# Patient Record
Sex: MALE | Race: BLACK OR AFRICAN AMERICAN | Employment: UNEMPLOYED | URBAN - METROPOLITAN AREA
[De-identification: names, ages, dates, MRNs, and addresses within clinical notes are randomized per-mention and may not be internally consistent; named-entity substitution may affect disease eponyms.]

---

## 2018-02-26 ENCOUNTER — HOSPITAL ENCOUNTER (EMERGENCY)
Facility: CLINIC | Age: 23
Discharge: HOME | End: 2018-02-26
Attending: PHYSICIAN ASSISTANT | Admitting: PHYSICIAN ASSISTANT

## 2018-02-26 VITALS
RESPIRATION RATE: 18 BRPM | OXYGEN SATURATION: 95 % | HEART RATE: 74 BPM | DIASTOLIC BLOOD PRESSURE: 79 MMHG | SYSTOLIC BLOOD PRESSURE: 117 MMHG | TEMPERATURE: 98 F

## 2018-02-26 DIAGNOSIS — Z87.898 H/O IDIOPATHIC SEIZURE: ICD-10-CM

## 2018-02-26 DIAGNOSIS — Z76.0 ENCOUNTER FOR MEDICATION REFILL: ICD-10-CM

## 2018-02-26 PROCEDURE — 99282 EMERGENCY DEPT VISIT SF MDM: CPT

## 2018-02-26 RX ORDER — CARBAMAZEPINE 200 MG/1
400 TABLET, EXTENDED RELEASE ORAL 2 TIMES DAILY
Qty: 120 TABLET | Refills: 0 | Status: SHIPPED | OUTPATIENT
Start: 2018-02-26 | End: 2018-03-29

## 2018-02-26 ASSESSMENT — ENCOUNTER SYMPTOMS
SEIZURES: 0
FEVER: 0
COUGH: 0
CHILLS: 0

## 2018-02-26 NOTE — ED PROVIDER NOTES
History     Chief Complaint:  Medication Refill    HPI   René Kumar is a 22 year old male who presents to the ED for a medication refill. The patient is on Tegretol-XR, 200 mg where he takes 2, 200 mg tablets twice a day. The patient last ran out of this medication last night, where he missed a dose this morning but was able to take his dose last night. He notes that he just moving to [place] and doesn't have insurance so came into the ED for a refill of this medication. The patient denies any symptoms and was told to come in here for refill of medications.    Allergies:  No known drug allergies     Medications:    Tegretol- mg 2 tabl by mouth 2x day    Past Medical History:    Seizures    Past Surgical History:    History reviewed. No pertinent surgical history.    Family History:    History reviewed. No pertinent family history.     Social History:  Smoking status: Never  Alcohol use: No  Marital Status:       Review of Systems   Constitutional: Negative for chills and fever.   Respiratory: Negative for cough.    Neurological: Negative for seizures.   All other systems reviewed and are negative.    Physical Exam     Patient Vitals for the past 24 hrs:   BP Temp Pulse Heart Rate Resp SpO2   02/26/18 1635 117/79 98  F (36.7  C) 74 74 18 95 %       Physical Exam  Constitutional: Alert, attentive  HENT:    Nose: Nose normal.    Mouth/Throat: Oropharynx is clear, mucous membranes are moist   Eyes: EOM are normal. Pupils are equal, round, and reactive to light.   CV: regular rate and rhythm  Chest: Effort normal and breath sounds normal.   MSK: Normal range of motion.   Neurological: Alert, attentive  Skin: Skin is warm and dry.     Emergency Department Course   Emergency Department Course:  Past medical records, nursing notes, and vitals reviewed.  4:40pm: I performed an exam of the patient and obtained history, as documented above.     I rechecked the patient. Findings and plan explained to the  Patient and family member. Patient discharged home with instructions regarding supportive care, medications, and reasons to return. The importance of close follow-up was reviewed.     Impression & Plan    Medical Decision Making:  René Kumar is a 22 year old male presented with request for medication that is without abuse potential. Requested refill until able to see PCP. They just moved to the [facility] from [place] and this medication is important for his health therefore I did refill prescription for 30 days to give them time for their insurance to begin. They have applied for [place] insurance and are waiting for that to start.  These medications do appear appropriate to maintain his health and as so they are refilled.  He had no other concerns or complaints.  He will follow-up with his primary care provider for further refills.    Diagnosis:    ICD-10-CM   1. Encounter for medication refill Z76.0   2. H/O idiopathic seizure Z87.898       Disposition:  discharged to home    Discharge Medications:  New Prescriptions    CARBAMAZEPINE (TEGRETOL XR) 200 MG 12 HR TABLET    Take 2 tablets (400 mg) by mouth 2 times daily       Rosa Ramon  2/26/2018   [facility] EMERGENCY DEPARTMENT  I, Rosa Ramon, am serving as a scribe at 4:40 PM on 2/26/2018 to document services personally performed by Piedad Peña PA-C based on my observations and the provider's statements to me.      Piedad Peña PA-C  02/26/18 1827

## 2018-02-26 NOTE — ED AVS SNAPSHOT
[facility] Emergency Department    [address]    [facility] [address]    Phone:  [phone]    Fax:  [phone]                                       René Kumar   MRN: [id number]    Department:  [facility] Emergency Department   Date of Visit:  2/26/2018           Patient Information     Date Of Birth          1995        Your diagnoses for this visit were:     Encounter for medication refill     H/O idiopathic seizure        You were seen by Piedad Peña PA-C.      Follow-up Information     Follow up with [facility] In 2 days.    Specialties:  Sports Medicine, Pain Management, Obstetrics & Gynecology, Pediatrics, Internal Medicine, Nephrology    Why:  As needed    Contact information:    [address]  [facility] [address] [phone]        Follow up with [facility] Emergency Department.    Specialty:  EMERGENCY MEDICINE    Why:  If symptoms worsen    Contact information:    [address]  [facility] [address] [phone]        Discharge Instructions       Please make sure you follow up with a clinic for further refills on the Tegretol.     24 Hour Appointment Hotline       To make an appointment at any [facility], call [phone] ([phone]). If you don't have a family doctor or clinic, we will help you find one. [place] clinics are conveniently located to serve the needs of you and your family.             Review of your medicines      CONTINUE these medicines which may have CHANGED, or have new prescriptions. If we are uncertain of the size of tablets/capsules you have at home, strength may be listed as something that might have changed.        Dose / Directions Last dose taken    * TEGRETOL PO   Dose:  200 mg   What changed:  Another medication with the same name was added. Make sure you understand how and when to take each.        Take  200 mg by mouth   Refills:  0        * carBAMazepine 200 MG 12 hr tablet   Commonly known as:  TEGRETOL XR   Dose:  400 mg   What changed:  You were already taking a medication with the same name, and this prescription was added. Make sure you understand how and when to take each.   Quantity:  120 tablet        Take 2 tablets (400 mg) by mouth 2 times daily   Refills:  0        * Notice:  This list has 2 medication(s) that are the same as other medications prescribed for you. Read the directions carefully, and ask your doctor or other care provider to review them with you.            Prescriptions were sent or printed at these locations (1 Prescription)                   Other Prescriptions                Printed at Department/Unit printer (1 of 1)         carBAMazepine (TEGRETOL XR) 200 MG 12 hr tablet                Orders Needing Specimen Collection     None      Pending Results     No orders found from 2/24/2018 to 2/27/2018.            Pending Culture Results     No orders found from 2/24/2018 to 2/27/2018.            Pending Results Instructions     If you had any lab results that were not finalized at the time of your Discharge, you can call the ED Lab Result RN at [phone]. You will be contacted by this team for any positive Lab results or changes in treatment. The nurses are available 7 days a week from 10A to 6:30P.  You can leave a message 24 hours per day and they will return your call.        Test Results From Your Hospital Stay               Clinical Quality Measure: Blood Pressure Screening     Your blood pressure was checked while you were in the emergency department today. The last reading we obtained was  BP: 117/79 . Please read the guidelines below about what these numbers mean and what you should do about them.  If your systolic blood pressure (the top number) is less than 120 and your diastolic blood pressure (the bottom number) is less than 80, then your blood pressure is normal. There is  "nothing more that you need to do about it.  If your systolic blood pressure (the top number) is 120-139 or your diastolic blood pressure (the bottom number) is 80-89, your blood pressure may be higher than it should be. You should have your blood pressure rechecked within a year by a primary care provider.  If your systolic blood pressure (the top number) is 140 or greater or your diastolic blood pressure (the bottom number) is 90 or greater, you may have high blood pressure. High blood pressure is treatable, but if left untreated over time it can put you at risk for heart attack, stroke, or kidney failure. You should have your blood pressure rechecked by a primary care provider within the next 4 weeks.  If your provider in the emergency department today gave you specific instructions to follow-up with your doctor or provider even sooner than that, you should follow that instruction and not wait for up to 4 weeks for your follow-up visit.        Thank you for choosing [facility]       Thank you for choosing [facility] for your care. Our goal is always to provide you with excellent care. Hearing back from our patients is one way we can continue to improve our services. Please take a few minutes to complete the written survey that you may receive in the mail after you visit with us. Thank you!        [employer] Information     [employer] lets you send messages to your doctor, view your test results, renew your prescriptions, schedule appointments and more. To sign up, go to [email or web address] . Click on \"Log in\" on the left side of the screen, which will take you to the Welcome page. Then click on \"Sign up Now\" on the right side of the page.     You will be asked to enter the access code listed below, as well as some personal information. Please follow the directions to create your username and password.     Your access code is: [email or web address]  Expires: 2018  5:15 PM     Your access code will  in 90 days. If you " need help or a new code, please call your [facility] [place] clinic or [phone].        Care EveryWhere ID     This is your Care EveryWhere ID. This could be used by other organizations to access your [facility] medical records  [id number]        Equal Access to Services     LINDA LUCERO : Facundo Barba, clementine ruiz, elba kaalpercy chilel, saleem chan. [facility] [phone].    ATENCIÓN: Si habla español, tiene a bryant disposición servicios gratuitos de asistencia lingüística. Llame al [phone].    We comply with applicable federal civil rights laws and [place] laws. We do not discriminate on the basis of race, color, national origin, age, disability, sex, sexual orientation, or gender identity.            After Visit Summary       This is your record. Keep this with you and show to your community pharmacist(s) and doctor(s) at your next visit.

## 2018-02-26 NOTE — ED NOTES
Patient presents with complaints of needing refill of seizure. Patient ran out last night. Patient takes medication at night and he received his does last night.

## 2018-03-29 ENCOUNTER — OFFICE VISIT (OUTPATIENT)
Dept: INTERNAL MEDICINE | Facility: CLINIC | Age: 23
End: 2018-03-29
Payer: MEDICAID

## 2018-03-29 VITALS
SYSTOLIC BLOOD PRESSURE: 108 MMHG | HEIGHT: 64 IN | HEART RATE: 70 BPM | TEMPERATURE: 98.9 F | DIASTOLIC BLOOD PRESSURE: 82 MMHG | OXYGEN SATURATION: 96 % | RESPIRATION RATE: 16 BRPM | WEIGHT: 162.6 LBS | BODY MASS INDEX: 27.76 KG/M2

## 2018-03-29 DIAGNOSIS — G40.909 SEIZURE DISORDER (H): Primary | ICD-10-CM

## 2018-03-29 LAB — CARBAMAZEPINE SERPL-MCNC: 14.5 MG/L (ref 4–12)

## 2018-03-29 PROCEDURE — 99202 OFFICE O/P NEW SF 15 MIN: CPT | Performed by: NURSE PRACTITIONER

## 2018-03-29 PROCEDURE — 80156 ASSAY CARBAMAZEPINE TOTAL: CPT | Performed by: NURSE PRACTITIONER

## 2018-03-29 PROCEDURE — 36415 COLL VENOUS BLD VENIPUNCTURE: CPT | Performed by: NURSE PRACTITIONER

## 2018-03-29 RX ORDER — CARBAMAZEPINE 200 MG/1
400 TABLET, EXTENDED RELEASE ORAL 2 TIMES DAILY
Qty: 120 TABLET | Refills: 3 | Status: SHIPPED | OUTPATIENT
Start: 2018-03-29 | End: 2018-06-27

## 2018-03-29 NOTE — PROGRESS NOTES
"  SUBJECTIVE:   René Kumar is a 22 year old male who presents to clinic today for the following health issues:      New Patient/Transfer of Care    H/o seizure disorder, needs med refill  Mother reports pt has difficulty sleeping, significant mood swings, believes it is due to meds.  Requests neurology referral.      Patient Active Problem List   Diagnosis     Seizure (H)     Past Surgical History:   Procedure Laterality Date     NO HISTORY OF SURGERY         Social History   Substance Use Topics     Smoking status: Never Smoker     Smokeless tobacco: Never Used     Alcohol use No     History reviewed. No pertinent family history.      Current Outpatient Prescriptions   Medication Sig Dispense Refill     carBAMazepine (TEGRETOL XR) 200 MG 12 hr tablet Take 2 tablets (400 mg) by mouth 2 times daily 120 tablet 3     CarBAMazepine (TEGRETOL PO) Take 200 mg by mouth       [DISCONTINUED] carBAMazepine (TEGRETOL XR) 200 MG 12 hr tablet Take 2 tablets (400 mg) by mouth 2 times daily 120 tablet 0     BP Readings from Last 3 Encounters:   03/29/18 108/82   02/26/18 117/79    Wt Readings from Last 3 Encounters:   03/29/18 162 lb 9.6 oz (73.8 kg)                    Reviewed and updated as needed this visit by clinical staff  Tobacco  Allergies  Meds  Med Hx  Surg Hx  Fam Hx  Soc Hx      Reviewed and updated as needed this visit by Provider         ROS:  Constitutional, HEENT, cardiovascular, pulmonary, gi and gu systems are negative, except as otherwise noted.    OBJECTIVE:     /82 (BP Location: Right arm, Patient Position: Sitting, Cuff Size: Adult Large)  Pulse 70  Temp 98.9  F (37.2  C) (Oral)  Resp 16  Ht 5' 4\" (1.626 m)  Wt 162 lb 9.6 oz (73.8 kg)  SpO2 96%  BMI 27.91 kg/m2  Body mass index is 27.91 kg/(m^2).  GENERAL: healthy, alert and no distress  PSYCH: pt is nonverbal during visit, all information given by mother and brother through interrupter         ASSESSMENT/PLAN:               " ICD-10-CM    1. Seizure disorder (H) G40.909 carBAMazepine (TEGRETOL XR) 200 MG 12 hr tablet     Carbamazepine total     NEUROLOGY ADULT REFERRAL       F/u prn    Teagan Mckeon NP  [facility]

## 2018-03-29 NOTE — NURSING NOTE
"Chief Complaint   Patient presents with     Establish Care     Refill Request       Initial /82 (BP Location: Right arm, Patient Position: Sitting, Cuff Size: Adult Large)  Pulse 70  Temp 98.9  F (37.2  C) (Oral)  Resp 16  Ht 5' 4\" (1.626 m)  Wt 162 lb 9.6 oz (73.8 kg)  SpO2 96%  BMI 27.91 kg/m2 Estimated body mass index is 27.91 kg/(m^2) as calculated from the following:    Height as of this encounter: 5' 4\" (1.626 m).    Weight as of this encounter: 162 lb 9.6 oz (73.8 kg).  Medication Reconciliation: complete    "

## 2018-03-29 NOTE — MR AVS SNAPSHOT
After Visit Summary   3/29/2018    René Kumar    MRN: [id number]           Patient Information     Date Of Birth          1995        Visit Information        Provider Department      3/29/2018 8:00 AM Teagan Mckeon NP; [employer] [facility]        Today's Diagnoses     Seizure disorder (H)    -  1       Follow-ups after your visit        Additional Services     NEUROLOGY ADULT REFERRAL       Your provider has referred you for the following:   Consult at [facility]: [facility] Neurology [facility] [phone]   [email or web address]    Please be aware that coverage of these services is subject to the terms and limitations of your health insurance plan.  Call member services at your health plan with any benefit or coverage questions.      Please bring the following with you to your appointment:    (1) Any X-Rays, CTs or MRIs which have been performed.  Contact the facility where they were done to arrange for  prior to your scheduled appointment.    (2) List of current medications  (3) This referral request   (4) Any documents/labs given to you for this referral                  Who to contact     If you have questions or need follow up information about today's clinic visit or your schedule please contact [facility] directly at [phone].  Normal or non-critical lab and imaging results will be communicated to you by MyChart, letter or phone within 4 business days after the clinic has received the results. If you do not hear from us within 7 days, please contact the clinic through MyChart or phone. If you have a critical or abnormal lab result, we will notify you by phone as soon as possible.  Submit refill requests through [employer] or call your pharmacy and they will forward the refill request to us. Please allow 3 business days for your refill to be completed.          Additional  "Information About Your Visit        MyChart Information     [employer] lets you send messages to your doctor, view your test results, renew your prescriptions, schedule appointments and more. To sign up, go to [email or web address] . Click on \"Log in\" on the left side of the screen, which will take you to the Welcome page. Then click on \"Sign up Now\" on the right side of the page.     You will be asked to enter the access code listed below, as well as some personal information. Please follow the directions to create your username and password.     Your access code is: [email or web address]  Expires: 2018  6:15 PM     Your access code will  in 90 days. If you need help or a new code, please call your [facility] clinic or [phone].        Care EveryWhere ID     This is your Care EveryWhere ID. This could be used by other organizations to access your [facility] medical records  [id number]        Your Vitals Were     Pulse Temperature Respirations Height Pulse Oximetry BMI (Body Mass Index)    70 98.9  F (37.2  C) (Oral) 16 5' 4\" (1.626 m) 96% 27.91 kg/m2       Blood Pressure from Last 3 Encounters:   18 108/82   18 117/79    Weight from Last 3 Encounters:   18 162 lb 9.6 oz (73.8 kg)              We Performed the Following     Carbamazepine total     NEUROLOGY ADULT REFERRAL          Where to get your medicines      These medications were sent to [facility] [address] [place] [address] [id number] [place] [address]  [address] [facility] [address]    Hours:  24-hours Phone:  [phone]     carBAMazepine 200 MG 12 hr tablet          Primary Care Provider Fax #    Physician No Ref-Primary [phone]       No address on file        Equal Access to Services     LINDA LUCERO : Facundo Barba, clementine ruiz, qaybta kaalpercy chilel, saleem chan. [facility] [phone].    ATENCIÓN: Si lauren hunter " disposición servicios gratuitos de asistencia lingüística. Johan proctor [phone].    We comply with applicable federal civil rights laws and [place] laws. We do not discriminate on the basis of race, color, national origin, age, disability, sex, sexual orientation, or gender identity.            Thank you!     Thank you for choosing [facility]  for your care. Our goal is always to provide you with excellent care. Hearing back from our patients is one way we can continue to improve our services. Please take a few minutes to complete the written survey that you may receive in the mail after your visit with us. Thank you!             Your Updated Medication List - Protect others around you: Learn how to safely use, store and throw away your medicines at www.disposemymeds.org.          This list is accurate as of 3/29/18  8:33 AM.  Always use your most recent med list.                   Brand Name Dispense Instructions for use Diagnosis    * TEGRETOL PO      Take 200 mg by mouth        * carBAMazepine 200 MG 12 hr tablet    TEGRETOL XR    120 tablet    Take 2 tablets (400 mg) by mouth 2 times daily    Seizure disorder (H)       * Notice:  This list has 2 medication(s) that are the same as other medications prescribed for you. Read the directions carefully, and ask your doctor or other care provider to review them with you.

## 2018-05-10 ENCOUNTER — TRANSFERRED RECORDS (OUTPATIENT)
Dept: HEALTH INFORMATION MANAGEMENT | Facility: CLINIC | Age: 23
End: 2018-05-10

## 2018-05-23 ENCOUNTER — TRANSFERRED RECORDS (OUTPATIENT)
Dept: HEALTH INFORMATION MANAGEMENT | Facility: CLINIC | Age: 23
End: 2018-05-23

## 2018-06-27 ENCOUNTER — OFFICE VISIT (OUTPATIENT)
Dept: INTERNAL MEDICINE | Facility: CLINIC | Age: 23
End: 2018-06-27
Payer: COMMERCIAL

## 2018-06-27 VITALS
HEIGHT: 64 IN | RESPIRATION RATE: 16 BRPM | WEIGHT: 165.2 LBS | OXYGEN SATURATION: 95 % | HEART RATE: 80 BPM | TEMPERATURE: 98 F | DIASTOLIC BLOOD PRESSURE: 78 MMHG | SYSTOLIC BLOOD PRESSURE: 100 MMHG | BODY MASS INDEX: 28.2 KG/M2

## 2018-06-27 DIAGNOSIS — G40.909 SEIZURE DISORDER (H): ICD-10-CM

## 2018-06-27 DIAGNOSIS — F09 COGNITIVE DISORDER: ICD-10-CM

## 2018-06-27 PROCEDURE — 99213 OFFICE O/P EST LOW 20 MIN: CPT | Performed by: NURSE PRACTITIONER

## 2018-06-27 RX ORDER — CARBAMAZEPINE 200 MG/1
400 TABLET, EXTENDED RELEASE ORAL 2 TIMES DAILY
Qty: 120 TABLET | Refills: 3 | Status: SHIPPED | OUTPATIENT
Start: 2018-06-27 | End: 2018-09-12

## 2018-06-27 NOTE — PROGRESS NOTES
"  SUBJECTIVE:   René Kumar is a 22 year old male who presents to clinic today for the following health issues:      [place]eds forms [place] for the county, pt can not work due to disability  Had neurology consult, did not continue remeron as it made pt more irritable   Has not had a psychiatric evaluation yet.        Patient Active Problem List   Diagnosis     Seizure (H)     Cognitive disorder     Past Surgical History:   Procedure Laterality Date     NO HISTORY OF SURGERY         Social History   Substance Use Topics     Smoking status: Never Smoker     Smokeless tobacco: Never Used     Alcohol use No     History reviewed. No pertinent family history.      Current Outpatient Prescriptions   Medication Sig Dispense Refill     carBAMazepine (TEGRETOL XR) 200 MG 12 hr tablet Take 2 tablets (400 mg) by mouth 2 times daily 120 tablet 3     CarBAMazepine (TEGRETOL PO) Take 200 mg by mouth       [DISCONTINUED] carBAMazepine (TEGRETOL XR) 200 MG 12 hr tablet Take 2 tablets (400 mg) by mouth 2 times daily 120 tablet 3     BP Readings from Last 3 Encounters:   06/27/18 100/78   03/29/18 108/82   02/26/18 117/79    Wt Readings from Last 3 Encounters:   06/27/18 165 lb 3.2 oz (74.9 kg)   03/29/18 162 lb 9.6 oz (73.8 kg)                    Reviewed and updated as needed this visit by clinical staff  Tobacco  Allergies  Meds  Med Hx  Surg Hx  Fam Hx  Soc Hx      Reviewed and updated as needed this visit by Provider         ROS:  Constitutional, HEENT, cardiovascular, pulmonary, gi and gu systems are negative, except as otherwise noted.    OBJECTIVE:     /78 (BP Location: Right arm, Patient Position: Sitting, Cuff Size: Adult Large)  Pulse 80  Temp 98  F (36.7  C) (Oral)  Resp 16  Ht 5' 4\" (1.626 m)  Wt 165 lb 3.2 oz (74.9 kg)  SpO2 95%  BMI 28.36 kg/m2  Body mass index is 28.36 kg/(m^2).  GENERAL: alert and no distress  PSYCH: does not engage in visit, no eye contact        ASSESSMENT/PLAN:               " ICD-10-CM    1. Cognitive disorder F09 MENTAL HEALTH REFERRAL  - Adult; Psychiatry and Medication Management; Psychiatry; [facility] Psychiatry Service [phone].  Medication management & future refills will be returned to G PCP upon completion of evaluation; We dandre...   2. Seizure disorder (H) G40.909 carBAMazepine (TEGRETOL XR) 200 MG 12 hr tablet       Forms completed  F/u psych and neuro    A total of 15 minutes was spent with the patient today, with greater than 50% of the visit involving counseling and coordination of care.      Teagan Mckeon NP  [facility]

## 2018-06-27 NOTE — MR AVS SNAPSHOT
After Visit Summary   6/27/2018    René Kumar    MRN: [id number]           Patient Information     Date Of Birth          1995        Visit Information        Provider Department      6/27/2018 2:45 PM Teagan Mckeon NP; [employer] [facility]        Today's Diagnoses     Cognitive disorder           Follow-ups after your visit        Additional Services     MENTAL HEALTH REFERRAL  - Adult; Psychiatry and Medication Management; Psychiatry; [facility] [facility] [phone].  Medication management & future refills will be returned to G PCP upon completion of evaluation; We dandre...       All scheduling is subject to the client's specific insurance plan & benefits, provider/location availability, and provider clinical specialities.  Please arrive 15 minutes early for your first appointment and bring your completed paperwork.    Please be aware that coverage of these services is subject to the terms and limitations of your health insurance plan.  Call member services at your health plan with any benefit or coverage questions.                            Who to contact     If you have questions or need follow up information about today's clinic visit or your schedule please contact [facility] directly at [phone].  Normal or non-critical lab and imaging results will be communicated to you by MyChart, letter or phone within 4 business days after the clinic has received the results. If you do not hear from us within 7 days, please contact the clinic through MyChart or phone. If you have a critical or abnormal lab result, we will notify you by phone as soon as possible.  Submit refill requests through [employer] or call your pharmacy and they will forward the refill request to us. Please allow 3 business days for your refill to be completed.          Additional Information About Your Visit        MyChart  "Information     Well[employer] lets you send messages to your doctor, view your test results, renew your prescriptions, schedule appointments and more. To sign up, go to [email or web address] . Click on \"Log in\" on the left side of the screen, which will take you to the Welcome page. Then click on \"Sign up Now\" on the right side of the page.     You will be asked to enter the access code listed below, as well as some personal information. Please follow the directions to create your username and password.     Your access code is: [email or web address]  Expires: 2018  3:00 PM     Your access code will  in 90 days. If you need help or a new code, please call your [facility] clinic or [phone].        Care EveryWhere ID     This is your Care EveryWhere ID. This could be used by other organizations to access your [facility] medical records  [id number]        Your Vitals Were     Pulse Temperature Respirations Height Pulse Oximetry BMI (Body Mass Index)    80 98  F (36.7  C) (Oral) 16 5' 4\" (1.626 m) 95% 28.36 kg/m2       Blood Pressure from Last 3 Encounters:   18 100/78   18 108/82   18 117/79    Weight from Last 3 Encounters:   18 165 lb 3.2 oz (74.9 kg)   18 162 lb 9.6 oz (73.8 kg)              We Performed the Following     MENTAL HEALTH REFERRAL  - Adult; Psychiatry and Medication Management; Psychiatry; [facility]: [facility] [phone].  Medication management & future refills will be returned to G PCP upon completion of evaluation; We dandre...        Primary Care Provider Fax #    Physician No Ref-Primary [phone]       No address on file        Equal Access to Services     LINDA LUCERO : Facundo Barba, clementine ruiz, elba chilel, saleem chan. So [facility] [phone].    ATENCIÓN: Si habla español, tiene a bryant disposición servicios gratuitos de asistencia lingüística. Llame al [phone].    We " comply with applicable federal civil rights laws and [place] laws. We do not discriminate on the basis of race, color, national origin, age, disability, sex, sexual orientation, or gender identity.            Thank you!     Thank you for choosing [facility]  for your care. Our goal is always to provide you with excellent care. Hearing back from our patients is one way we can continue to improve our services. Please take a few minutes to complete the written survey that you may receive in the mail after your visit with us. Thank you!             Your Updated Medication List - Protect others around you: Learn how to safely use, store and throw away your medicines at www.disposemymeds.org.          This list is accurate as of 6/27/18  3:31 PM.  Always use your most recent med list.                   Brand Name Dispense Instructions for use Diagnosis    * TEGRETOL PO      Take 200 mg by mouth        * carBAMazepine 200 MG 12 hr tablet    TEGRETOL XR    120 tablet    Take 2 tablets (400 mg) by mouth 2 times daily    Seizure disorder (H)       * Notice:  This list has 2 medication(s) that are the same as other medications prescribed for you. Read the directions carefully, and ask your doctor or other care provider to review them with you.

## 2018-08-01 ENCOUNTER — OFFICE VISIT (OUTPATIENT)
Dept: PSYCHIATRY | Facility: CLINIC | Age: 23
End: 2018-08-01
Attending: NURSE PRACTITIONER
Payer: COMMERCIAL

## 2018-08-01 DIAGNOSIS — G47.00 INSOMNIA, UNSPECIFIED TYPE: Primary | ICD-10-CM

## 2018-08-01 DIAGNOSIS — G40.909 SEIZURE DISORDER (H): ICD-10-CM

## 2018-08-01 DIAGNOSIS — R45.1 AGITATION: ICD-10-CM

## 2018-08-01 PROCEDURE — 90792 PSYCH DIAG EVAL W/MED SRVCS: CPT | Performed by: PSYCHIATRY & NEUROLOGY

## 2018-08-01 RX ORDER — QUETIAPINE FUMARATE 25 MG/1
TABLET, FILM COATED ORAL
Qty: 60 TABLET | Refills: 1 | Status: SHIPPED | OUTPATIENT
Start: 2018-08-01 | End: 2018-08-01

## 2018-08-01 RX ORDER — QUETIAPINE FUMARATE 25 MG/1
TABLET, FILM COATED ORAL
Qty: 60 TABLET | Refills: 1 | Status: SHIPPED | OUTPATIENT
Start: 2018-08-01 | End: 2018-09-05 | Stop reason: DRUGHIGH

## 2018-08-01 NOTE — MR AVS SNAPSHOT
After Visit Summary   8/1/2018    René Kumar    MRN: [id number]           Patient Information     Date Of Birth          1995        Visit Information        Provider Department      8/1/2018 3:45 PM Tomas Chambers MD; LANGUAGE [facility]        Today's Diagnoses     Insomnia, unspecified type    -  1    Agitation        Seizure disorder (H)          Care Instructions    - Will have our clinic  contact you and your family to offer resources for socialization and education  - Start Seroquel 25mg at bedtime for sleep. Can take an additional 25mg during daytime as needed for anger/agitation.   - Will make referral to neurology.   - Return to clinic in 1-2 months          Follow-ups after your visit        Additional Services     [facility] Epilepsy Referral       Your provider has referred you to: [facility]: [facility] [place] [phone]   [email or web address]  - Patient requires EEG and MRI to assess seizure disorder    Please be aware that coverage of these services is subject to the terms and limitations of your health insurance plan.  Call member services at your health plan with any benefit or coverage questions.      Please bring the following to your appointment:    >>   Any x-rays, CTs or MRIs which have been performed.  Contact the facility where they were done to arrange for  prior to your scheduled appointment.  Any new CT, MRI or other procedures ordered by your specialist must be performed at a [place] facility or coordinated by your clinic's referral office.    >>   List of current medications   >>   This referral request   >>   Any documents/labs given to you for this referral                  Your next 10 appointments already scheduled     Sep 05, 2018  3:30 PM CDT   Return Visit with Tomas Chambers MD   [facility] ([facility])    [address] [facility] [address] [phone]              Who to contact     If you have questions or need follow up information about today's clinic visit or your schedule please contact [facility] directly at [phone].  Normal or non-critical lab and imaging results will be communicated to you by MyChart, letter or phone within 4 business days after the clinic has received the results. If you do not hear from us within 7 days, please contact the clinic through MyChart or phone. If you have a critical or abnormal lab result, we will notify you by phone as soon as possible.  Submit refill requests through [employer] or call your pharmacy and they will forward the refill request to us. Please allow 3 business days for your refill to be completed.          Additional Information About Your Visit        Care EveryWhere ID     This is your Care EveryWhere ID. This could be used by other organizations to access your [place] medical records  [id number]         Blood Pressure from Last 3 Encounters:   06/27/18 100/78   03/29/18 108/82   02/26/18 117/79    Weight from Last 3 Encounters:   06/27/18 74.9 kg (165 lb 3.2 oz)   03/29/18 73.8 kg (162 lb 9.6 oz)              We Performed the Following     [facility]P Epilepsy Referral          Today's Medication Changes          These changes are accurate as of 8/1/18  5:22 PM.  If you have any questions, ask your nurse or doctor.               Start taking these medicines.        Dose/Directions    QUEtiapine 25 MG tablet   Commonly known as:  SEROquel   Used for:  Insomnia, unspecified type, Agitation        Take 1 tablet at bedtime. Can take 1 additional tablet during daytime as needed for irritability/anger.   Quantity:  60 tablet   Refills:  1            Where to get your medicines      These medications were sent to [facility] [address] [place] [address] [id number] [place] [address]   [address] [facility] [address]     Phone:  [phone]     QUEtiapine 25 MG tablet                Primary Care Provider Fax #    Physician No Ref-Primary [phone]       No address on file        Equal Access to Services     LINDA LUCERO : Facundo adal ruiz gloria Barba, waaxda luqadaha, qaybta kaalmada adekelly, saleem arevalo laJackai chan. So [facility] [phone].    ATENCIÓN: Si habla español, tiene a bryant disposición servicios gratuitos de asistencia lingüística. Llame al [phone].    We comply with applicable federal civil rights laws and [place] laws. We do not discriminate on the basis of race, color, national origin, age, disability, sex, sexual orientation, or gender identity.            Thank you!     Thank you for choosing [facility] PRIMARY CARE  for your care. Our goal is always to provide you with excellent care. Hearing back from our patients is one way we can continue to improve our services. Please take a few minutes to complete the written survey that you may receive in the mail after your visit with us. Thank you!             Your Updated Medication List - Protect others around you: Learn how to safely use, store and throw away your medicines at www.disposemymeds.org.          This list is accurate as of 8/1/18  5:22 PM.  Always use your most recent med list.                   Brand Name Dispense Instructions for use Diagnosis    QUEtiapine 25 MG tablet    SEROquel    60 tablet    Take 1 tablet at bedtime. Can take 1 additional tablet during daytime as needed for irritability/anger.    Insomnia, unspecified type, Agitation       * TEGRETOL PO      Take 200 mg by mouth        * carBAMazepine 200 MG 12 hr tablet    TEGRETOL XR    120 tablet    Take 2 tablets (400 mg) by mouth 2 times daily    Seizure disorder (H)       * Notice:  This list has 2 medication(s) that are the same as other medications prescribed for you. Read the directions carefully, and  ask your doctor or other care provider to review them with you.

## 2018-08-01 NOTE — PATIENT INSTRUCTIONS
- Will have our clinic  contact you and your family to offer resources for socialization and education  - Start Seroquel 25mg at bedtime for sleep. Can take an additional 25mg during daytime as needed for anger/agitation.   - Will make referral to neurology.   - Return to clinic in 1-2 months

## 2018-08-13 ENCOUNTER — APPOINTMENT (OUTPATIENT)
Dept: ULTRASOUND IMAGING | Facility: CLINIC | Age: 23
End: 2018-08-13
Attending: EMERGENCY MEDICINE
Payer: COMMERCIAL

## 2018-08-13 ENCOUNTER — HOSPITAL ENCOUNTER (EMERGENCY)
Facility: CLINIC | Age: 23
Discharge: HOME | End: 2018-08-13
Attending: EMERGENCY MEDICINE | Admitting: EMERGENCY MEDICINE
Payer: COMMERCIAL

## 2018-08-13 VITALS
SYSTOLIC BLOOD PRESSURE: 125 MMHG | TEMPERATURE: 97.2 F | DIASTOLIC BLOOD PRESSURE: 87 MMHG | RESPIRATION RATE: 16 BRPM | OXYGEN SATURATION: 98 %

## 2018-08-13 DIAGNOSIS — N30.01 ACUTE CYSTITIS WITH HEMATURIA: ICD-10-CM

## 2018-08-13 DIAGNOSIS — N43.3 HYDROCELE, UNSPECIFIED HYDROCELE TYPE: ICD-10-CM

## 2018-08-13 LAB
ALBUMIN UR-MCNC: 30 MG/DL
APPEARANCE UR: ABNORMAL
BACTERIA #/AREA URNS HPF: ABNORMAL /HPF
BILIRUB UR QL STRIP: NEGATIVE
CAOX CRY #/AREA URNS HPF: ABNORMAL /HPF
COLOR UR AUTO: YELLOW
GLUCOSE UR STRIP-MCNC: NEGATIVE MG/DL
HGB UR QL STRIP: NEGATIVE
KETONES UR STRIP-MCNC: NEGATIVE MG/DL
LEUKOCYTE ESTERASE UR QL STRIP: ABNORMAL
MUCOUS THREADS #/AREA URNS LPF: PRESENT /LPF
NITRATE UR QL: NEGATIVE
PH UR STRIP: 5 PH (ref 5–7)
RBC #/AREA URNS AUTO: 8 /HPF (ref 0–2)
SOURCE: ABNORMAL
SP GR UR STRIP: 1.03 (ref 1–1.03)
UROBILINOGEN UR STRIP-MCNC: 0 MG/DL (ref 0–2)
WBC #/AREA URNS AUTO: 19 /HPF (ref 0–5)

## 2018-08-13 PROCEDURE — 81001 URINALYSIS AUTO W/SCOPE: CPT | Performed by: EMERGENCY MEDICINE

## 2018-08-13 PROCEDURE — 99284 EMERGENCY DEPT VISIT MOD MDM: CPT | Mod: 25

## 2018-08-13 PROCEDURE — 25000132 ZZH RX MED GY IP 250 OP 250 PS 637: Performed by: EMERGENCY MEDICINE

## 2018-08-13 PROCEDURE — 76870 US EXAM SCROTUM: CPT

## 2018-08-13 PROCEDURE — 87086 URINE CULTURE/COLONY COUNT: CPT | Performed by: EMERGENCY MEDICINE

## 2018-08-13 RX ORDER — IBUPROFEN 200 MG
600 TABLET ORAL EVERY 6 HOURS PRN
Qty: 60 TABLET | Refills: 0 | Status: SHIPPED | OUTPATIENT
Start: 2018-08-13 | End: 2020-10-14

## 2018-08-13 RX ORDER — CEPHALEXIN 500 MG/1
500 CAPSULE ORAL 2 TIMES DAILY
Qty: 10 CAPSULE | Refills: 0 | Status: SHIPPED | OUTPATIENT
Start: 2018-08-13 | End: 2018-08-18

## 2018-08-13 RX ORDER — IBUPROFEN 600 MG/1
600 TABLET, FILM COATED ORAL ONCE
Status: COMPLETED | OUTPATIENT
Start: 2018-08-13 | End: 2018-08-13

## 2018-08-13 RX ADMIN — IBUPROFEN 600 MG: 600 TABLET, FILM COATED ORAL at 20:07

## 2018-08-13 ASSESSMENT — ENCOUNTER SYMPTOMS
HEMATURIA: 0
ABDOMINAL PAIN: 0
VOMITING: 0
HEADACHES: 0
FEVER: 0
NAUSEA: 0
DYSURIA: 0

## 2018-08-13 NOTE — ED AVS SNAPSHOT
" [facility] Emergency Department    [address]    [facility] [address]    Phone:  [phone]    Fax:  [phone]                                       René Kumar   MRN: [id number]    Department:  [facility] Emergency Department   Date of Visit:  8/13/2018           Patient Information     Date Of Birth          1995        Your diagnoses for this visit were:     Acute cystitis with hematuria     Hydrocele, unspecified hydrocele type        You were seen by Jairo Carranza DO.      Follow-up Information     Call [facility].    Specialty:  Family Medicine    Why:  For follow up, if you need to establish care with a primary care clinic    Contact information:    [address]  [facility] [address] [phone]        Follow up with [facility]. Call in 2 days.    Why:  As needed, for Hydrocele follow up    Contact information:    [address]  [address]  [facility] [address] [phone]        Follow up with [facility].    Specialty:  EMERGENCY MEDICINE    Why:  If symptoms worsen    Contact information:    [address]  [facility] [id number] [phone]        Discharge Instructions          * BLADDER INFECTION: Male (Adult)    A bladder infection (\"cystitis\" or \"UTI\") usually causes a constant urge to urinate, and a burning when passing urine. Urine may be cloudy, smelly or dark. There may be also be pain in the lower abdomen.  Cystitis in males is not common. It may be caused by a partial blockage in the urinary system that keeps the bladder from emptying completely. This is most often related to an enlarged prostate gland.  HOME CARE:  1. Drink lots of fluids (at least 6-8 glasses a day). This will flush the bacteria out of your bladder. Cranberry juice has been shown to help clear out the bacteria.  2. Avoid sexual " "intercourse until your symptoms are gone.  3. A bladder infection is treated with antibiotics. You may also be given Pyridium (generic - phenazopyridine) to reduce burning with urination. This will cause urine to become a bright orange color, which can stain clothing.  FOLLOW UP with your doctor or this facility if ALL symptoms have not cleared within five days. It is important to keep your follow up appointment to discuss with your doctor the need for further tests of the urinary tract.  GET PROMPT MEDICAL ATTENTION if any of the following occur:    Fever over 101  F (38.3  C)    No improvement by the third day of treatment    Increasing back or abdominal pain    Repeated vomiting; unable to keep medicine down    Weakness, dizziness or fainting    1454-1742 [employer]. [address] [place] [address], [place] [address]. All rights reserved. This information is not intended as a substitute for professional medical care. Always follow your healthcare professional's instructions.  This information has been modified by your health care provider with permission from the publisher.    You also have a \"hydrocele.\" This is a fluid collection around your left testicle.         Your next 10 appointments already scheduled     Sep 05, 2018  3:30 PM CDT   Return Visit with Tomas Chambers MD   [facility] ([facility])    [address]  [facility] [address] [phone]            Sep 12, 2018  9:30 AM CDT   EEG with [facility] EEG 3   [facility])    [address]  [facility] [address]   [phone]            Sep 12, 2018  1:00 PM CDT   New Patient Visit with Clay Dyson MD   [facility] ([facility])    [address]  [facility] [address] [phone]              24 Hour Appointment Hotline       To make an appointment at any [facility]" clinic, call [phone]). If you don't have a family doctor or clinic, we will help you find one. [place] clinics are conveniently located to serve the needs of you and your family.             Review of your medicines      START taking        Dose / Directions Last dose taken    cephALEXin 500 MG capsule   Commonly known as:  KEFLEX   Dose:  500 mg   Quantity:  10 capsule        Take 1 capsule (500 mg) by mouth 2 times daily for 5 days   Refills:  0        ibuprofen 200 MG tablet   Commonly known as:  ADVIL/MOTRIN   Dose:  600 mg   Quantity:  60 tablet        Take 3 tablets (600 mg) by mouth every 6 hours as needed for mild pain or fever   Refills:  0          Our records show that you are taking the medicines listed below. If these are incorrect, please call your family doctor or clinic.        Dose / Directions Last dose taken    QUEtiapine 25 MG tablet   Commonly known as:  SEROquel   Quantity:  60 tablet        Take 1 tablet at bedtime. Can take 1 additional tablet during daytime as needed for irritability/anger.   Refills:  1        * TEGRETOL PO   Dose:  200 mg        Take 200 mg by mouth   Refills:  0        * carBAMazepine 200 MG 12 hr tablet   Commonly known as:  TEGRETOL XR   Dose:  400 mg   Quantity:  120 tablet        Take 2 tablets (400 mg) by mouth 2 times daily   Refills:  3        * Notice:  This list has 2 medication(s) that are the same as other medications prescribed for you. Read the directions carefully, and ask your doctor or other care provider to review them with you.            Prescriptions were sent or printed at these locations (2 Prescriptions)                   Other Prescriptions                Printed at Department/Unit printer (2 of 2)         cephALEXin (KEFLEX) 500 MG capsule               ibuprofen (ADVIL/MOTRIN) 200 MG tablet                Procedures and tests performed during your visit     UA with Microscopic    US Testicular & Scrotum w Doppler Ltd     Urine Culture      Orders Needing Specimen Collection     None      Pending Results     Date and Time Order Name Status Description    8/13/2018 2002 Urine Culture Preliminary             Pending Culture Results     Date and Time Order Name Status Description    8/13/2018 2002 Urine Culture Preliminary             Pending Results Instructions     If you had any lab results that were not finalized at the time of your Discharge, you can call the ED Lab Result RN at [phone]. You will be contacted by this team for any positive Lab results or changes in treatment. The nurses are available 7 days a week from 10A to 6:30P.  You can leave a message 24 hours per day and they will return your call.        Test Results From Your Hospital Stay        8/13/2018  8:43 PM      Component Results     Component Value Ref Range & Units Status    Color Urine Yellow  Final    Appearance Urine Slightly Cloudy  Final    Glucose Urine Negative NEG^Negative mg/dL Final    Bilirubin Urine Negative NEG^Negative Final    Ketones Urine Negative NEG^Negative mg/dL Final    Specific Gravity Urine 1.026 1.003 - 1.035 Final    Blood Urine Negative NEG^Negative Final    pH Urine 5.0 5.0 - 7.0 pH Final    Protein Albumin Urine 30 (A) NEG^Negative mg/dL Final    Urobilinogen mg/dL 0.0 0.0 - 2.0 mg/dL Final    Nitrite Urine Negative NEG^Negative Final    Leukocyte Esterase Urine Trace (A) NEG^Negative Final    Source Midstream Urine  Final    WBC Urine 19 (H) 0 - 5 /HPF Final    RBC Urine 8 (H) 0 - 2 /HPF Final    Bacteria Urine Few (A) NEG^Negative /HPF Final    Mucous Urine Present (A) NEG^Negative /LPF Final    Calcium Oxalate Few (A) NEG^Negative /HPF Final         8/13/2018 11:00 PM      Component Results     Component    Specimen Description    Midstream Urine    Special Requests    Specimen received in preservative    Culture Micro    PENDING         8/13/2018 10:30 PM      Narrative     US TESTICULAR AND SCROTUM WITH DOPPLER LIMITED   8/13/2018 10:10 PM     HISTORY: Right scrotal pain.     COMPARISON: None.    FINDINGS:   Right: Right testis measures 4.1 x 2.1 x 1.7 cm with normal blood  flow. No testicular mass. Epididymis is normal with normal blood flow.  No hydrocele or varicocele.    Left: Left testis measures 3.9 x 1.9 x 2.3 cm and appears normal. No  testicular mass. Normal blood flow. Epididymis is normal with normal  blood flow. Very small hydrocele. No varicocele.        Impression     IMPRESSION: No testicular mass. Normal blood flow to the testes  bilaterally. Small left hydrocele. No cause for right-sided pain  identified.     CHARLIE PEREZ MD                Clinical Quality Measure: Blood Pressure Screening     Your blood pressure was checked while you were in the emergency department today. The last reading we obtained was  BP: 125/87 . Please read the guidelines below about what these numbers mean and what you should do about them.  If your systolic blood pressure (the top number) is less than 120 and your diastolic blood pressure (the bottom number) is less than 80, then your blood pressure is normal. There is nothing more that you need to do about it.  If your systolic blood pressure (the top number) is 120-139 or your diastolic blood pressure (the bottom number) is 80-89, your blood pressure may be higher than it should be. You should have your blood pressure rechecked within a year by a primary care provider.  If your systolic blood pressure (the top number) is 140 or greater or your diastolic blood pressure (the bottom number) is 90 or greater, you may have high blood pressure. High blood pressure is treatable, but if left untreated over time it can put you at risk for heart attack, stroke, or kidney failure. You should have your blood pressure rechecked by a primary care provider within the next 4 weeks.  If your provider in the emergency department today gave you specific instructions to follow-up with your doctor or  provider even sooner than that, you should follow that instruction and not wait for up to 4 weeks for your follow-up visit.        Thank you for choosing [facility]       Thank you for choosing [facility] for your care. Our goal is always to provide you with excellent care. Hearing back from our patients is one way we can continue to improve our services. Please take a few minutes to complete the written survey that you may receive in the mail after you visit with us. Thank you!        Care EveryWhere ID     This is your Care EveryWhere ID. This could be used by other organizations to access your [place] medical records  [id number]        Equal Access to Services     LINDA LUCERO : Facundo Barba, clementine ruiz, elba chilel, saleem chan. So [facility] [phone].    ATENCIÓN: Si habla español, tiene a bryant disposición servicios gratuitos de asistencia lingüística. Llame al [phone].    We comply with applicable federal civil rights laws and [place] laws. We do not discriminate on the basis of race, color, national origin, age, disability, sex, sexual orientation, or gender identity.            After Visit Summary       This is your record. Keep this with you and show to your community pharmacist(s) and doctor(s) at your next visit.

## 2018-08-13 NOTE — ED AVS SNAPSHOT
[facility] Emergency Department    [address]    [facility] [address]    Phone:  [phone]    Fax:  [phone]                                       René Kumar   MRN: [id number]    Department:  [facility] Emergency Department   Date of Visit:  8/13/2018           After Visit Summary Signature Page     I have received my discharge instructions, and my questions have been answered. I have discussed any challenges I see with this plan with the nurse or doctor.    ..........................................................................................................................................  Patient/Patient Representative Signature      ..........................................................................................................................................  Patient Representative Print Name and Relationship to Patient    ..................................................               ................................................  Date                                            Time    ..........................................................................................................................................  Reviewed by Signature/Title    ...................................................              ..............................................  Date                                                            Time

## 2018-08-14 LAB
BACTERIA SPEC CULT: NORMAL
Lab: NORMAL
SPECIMEN SOURCE: NORMAL

## 2018-08-14 NOTE — ED PROVIDER NOTES
History     Chief Complaint:  Altered Mental Status    The history is provided by the patient, a parent and a relative.      René Kumar is a 22 year old male with a history of seizure and cognitive disorder who presents to the emergency department today with altered mental status. The patient has started a new medicine for anger and outbursts and complains of testicular and scrotum pain, which has been present for the last two nights and is worse when he sleeps on his right side. Patient reports associated incontinence. He denies dysuria, hematuria. Patient denies injury to the area. The patient denies headache. He denies abdominal pain, nausea, vomiting. He denies recent fever.     Allergies:  No Known Drug Allergies     Medications:    Tegretol  Seroquel     Past Medical History:    Cognitive disorder  Seizure    Past Surgical History:    The patient denies any relevant past medical history.    Family History:    History reviewed. No pertinent family history.     Social History:  The patient was accompanied to the ED by family.  Smoking Status: Never  Smokeless Tobacco: Never  Alcohol Use: No    Marital Status:  Single    Review of Systems   Constitutional: Negative for fever.   Gastrointestinal: Negative for abdominal pain, nausea and vomiting.   Genitourinary: Positive for penile pain (scrotal pain right side). Negative for dysuria and hematuria. Scrotal swelling: scrotal pain.   Neurological: Negative for headaches.   All other systems reviewed and are negative.    Physical Exam     Patient Vitals for the past 24 hrs:   BP Temp Temp src Heart Rate Resp SpO2   08/13/18 2310 - - - 64 16 98 %   08/13/18 2209 125/87 - - 60 16 97 %   08/13/18 1928 177/84 97.2  F (36.2  C) Temporal 75 16 97 %      Physical Exam  Constitutional: Patient appears well-developed and well-nourished. Patient is in no acute distress  HENT:    Head: No external signs of trauma noted.   Eyes: Conjunctivae are normal. Pupils are  equal, round, and reactive to light.   Cardiovascular:    Normal rate, regular rhythm and normal heart sounds.     Exam reveals no friction rub.     No murmur heard.  Pulmonary/Chest:    Effort normal and breath sounds normal.    No respiratory distress.    There are no wheezes.    There are no rales.   Abdominal:    Soft.    Bowel sounds normal.    There is no distension.    There is no tenderness.    There is no rebound or guarding.   :   Normal appearance of external male genitalia   No penile discharge   Circumcised   Left testicle appears non-swollen. Minimal left testicular tenderness. No masses palpated   Right testicle appears non-swollen. No right testicular tenderness. No masses palpated.  Musculoskeletal:    Normal range of motion.    Normal Tone  Neurological: Patient is alert and oriented to person, place, and time.   Skin: Skin is warm and dry. Patient is not diaphoretic.    Emergency Department Course   Imaging:  Radiology findings were communicated with the patient and family who voiced understanding of the findings.  US Testicular & Scrotum w Doppler  IMPRESSION: No testicular mass. Normal blood flow to the testes bilaterally. Small left hydrocele. No cause for right-sided pain  identified.  Report per radiology      Laboratory:  Laboratory findings were communicated with the patient and family who voiced understanding of the findings.  UA: slightly cloudy, yellow urine with trace Leukocyte esterase, 30 protein albumin, 19 WBC, 8 RBC, few bacteria, mucous present, few calcium oxilate o/w WNL   Urine Culture Aerobic Bacterial: Pending     Interventions:  2007: Advil 600mg PO     Emergency Department Course:  Nursing notes and vitals reviewed.  1951: I performed an exam of the patient as documented above.   The patient provided a urine sample here in the emergency department. This was sent for laboratory testing, findings above.  The patient was sent for a US Testicular & Scrotum w Doppler while in  the emergency department, results above.   2248: Patient rechecked and updated.    2303: Findings and plan explained to the Patient. Patient discharged home with instructions regarding supportive care, medications, and reasons to return. The importance of close follow-up was reviewed. The patient was prescribed Keflex and Advil.    I personally reviewed the laboratory and imaging results with the Patient and answered all related questions prior to discharge.     Impression & Plan    Medical Decision Making:  This 22-year-old male patient presents the ED with his family due to concerns for testicular pain and pain with urination.  Please see the HPI and exam for specifics.  Via the  the patient notes right-sided testicular pain though on my physical exam when undressed he points to the left.  I did not feel any palpable masses on the testicles.  Urinalysis does show some white blood cells and the ultrasound demonstrates a left-sided hydrocele.  Given the patient's discomfort with urination I would consider treating him with antibiotics but also giving him close outpatient follow-up with primary care and urology.  Anticipatory guidance given to family via the  prior to discharge.    Diagnosis:    ICD-10-CM    1. Acute cystitis with hematuria N30.01 Urine Culture   2. Hydrocele, unspecified hydrocele type N43.3        Disposition:  discharged to home    Discharge Medications:  Discharge Medication List as of 8/13/2018 11:05 PM      START taking these medications    Details   cephALEXin (KEFLEX) 500 MG capsule Take 1 capsule (500 mg) by mouth 2 times daily for 5 days, Disp-10 capsule, R-0, Local Print      ibuprofen (ADVIL/MOTRIN) 200 MG tablet Take 3 tablets (600 mg) by mouth every 6 hours as needed for mild pain or fever, Disp-60 tablet, R-0, Local Print             Scribe Disclosure:  Syvlie DA SILVA, am serving as a scribe at 7:51 PM on 8/13/2018 to document services personally performed by  Jairo Carranza,  based on my observations and the provider's statements to me.    8/13/2018   [facility] EMERGENCY DEPARTMENT       Jairo Carranza DO  08/14/18 0150

## 2018-08-14 NOTE — DISCHARGE INSTRUCTIONS
"   * BLADDER INFECTION: Male (Adult)    A bladder infection (\"cystitis\" or \"UTI\") usually causes a constant urge to urinate, and a burning when passing urine. Urine may be cloudy, smelly or dark. There may be also be pain in the lower abdomen.  Cystitis in males is not common. It may be caused by a partial blockage in the urinary system that keeps the bladder from emptying completely. This is most often related to an enlarged prostate gland.  HOME CARE:  1. Drink lots of fluids (at least 6-8 glasses a day). This will flush the bacteria out of your bladder. Cranberry juice has been shown to help clear out the bacteria.  2. Avoid sexual intercourse until your symptoms are gone.  3. A bladder infection is treated with antibiotics. You may also be given Pyridium (generic - phenazopyridine) to reduce burning with urination. This will cause urine to become a bright orange color, which can stain clothing.  FOLLOW UP with your doctor or this facility if ALL symptoms have not cleared within five days. It is important to keep your follow up appointment to discuss with your doctor the need for further tests of the urinary tract.  GET PROMPT MEDICAL ATTENTION if any of the following occur:    Fever over 101  F (38.3  C)    No improvement by the third day of treatment    Increasing back or abdominal pain    Repeated vomiting; unable to keep medicine down    Weakness, dizziness or fainting    1093-4433 [employer]. [address] [place] [address] 37543. All rights reserved. This information is not intended as a substitute for professional medical care. Always follow your healthcare professional's instructions.  This information has been modified by your health care provider with permission from the publisher.    You also have a \"hydrocele.\" This is a fluid collection around your left testicle.       "

## 2018-08-14 NOTE — ED TRIAGE NOTES
"Pt arrives with mother sent by clinic for eval. Mother states pt has \"mental problem\" and was sent here for further evaluation. Hx of seizures, none recently, on medications. Mother states pt has been talking to himself and is confused. Mother states \"he has been acting this way his whole life\".     Triage completed using Finnish .   "

## 2018-09-05 ENCOUNTER — OFFICE VISIT (OUTPATIENT)
Dept: PSYCHIATRY | Facility: CLINIC | Age: 23
End: 2018-09-05
Payer: COMMERCIAL

## 2018-09-05 DIAGNOSIS — F84.0 AUTISM: Primary | ICD-10-CM

## 2018-09-05 PROCEDURE — 99214 OFFICE O/P EST MOD 30 MIN: CPT | Performed by: PSYCHIATRY & NEUROLOGY

## 2018-09-05 RX ORDER — QUETIAPINE FUMARATE 50 MG/1
50 TABLET, FILM COATED ORAL
Qty: 30 TABLET | Refills: 1 | Status: SHIPPED | OUTPATIENT
Start: 2018-09-05 | End: 2019-05-20

## 2018-09-05 NOTE — MR AVS SNAPSHOT
After Visit Summary   9/5/2018    René Kumar    MRN: [id number]           Patient Information     Date Of Birth          1995        Visit Information        Provider Department      9/5/2018 3:15 PM Tomas Chambers MD; LANGUAGE [facility]        Today's Diagnoses     Autism    -  1       Follow-ups after your visit        Your next 10 appointments already scheduled     Sep 12, 2018  9:30 AM CDT   EEG with [facility] 3   [facility]    [address], [address]  [facility] [id number] [phone]            Sep 12, 2018  1:00 PM CDT   New Patient Visit with Clay Dyson MD   [facility]    [address], [address]  [facility] [id number] [phone]              Who to contact     If you have questions or need follow up information about today's clinic visit or your schedule please contact [facility] directly at [phone].  Normal or non-critical lab and imaging results will be communicated to you by MyChart, letter or phone within 4 business days after the clinic has received the results. If you do not hear from us within 7 days, please contact the clinic through MyChart or phone. If you have a critical or abnormal lab result, we will notify you by phone as soon as possible.  Submit refill requests through [employer] or call your pharmacy and they will forward the refill request to us. Please allow 3 business days for your refill to be completed.          Additional Information About Your Visit        Care EveryWhere ID     This is your Care EveryWhere ID. This could be used by other organizations to access your [facility] medical records  [id number]         Blood Pressure from Last 3 Encounters:   08/13/18 125/87   06/27/18 100/78   03/29/18 108/82    Weight from Last 3 Encounters:   06/27/18 74.9 kg (165 lb  3.2 oz)   03/29/18 73.8 kg (162 lb 9.6 oz)              Today, you had the following     No orders found for display         Today's Medication Changes          These changes are accurate as of 9/5/18  5:13 PM.  If you have any questions, ask your nurse or doctor.               These medicines have changed or have updated prescriptions.        Dose/Directions    QUEtiapine 50 MG tablet   Commonly known as:  SEROQUEL   This may have changed:    - medication strength  - how much to take  - how to take this  - when to take this  - reasons to take this  - additional instructions   Used for:  Autism        Dose:  50 mg   Take 1 tablet (50 mg) by mouth nightly as needed   Quantity:  30 tablet   Refills:  1            Where to get your medicines      [facility] [address] [place] [address]     Phone:  [phone]     QUEtiapine 50 MG tablet                Primary Care Provider Fax #    Physician No Ref-Primary [phone]       No address on file        Equal Access to Services     [facility]: Facundo castro Soradha, waaxda luqadaha, qaybta kaalmada adekelly, saleem campos . So [facility] [phone].    ATENCIÓN: Si habla español, tiene a bryant disposición servicios gratuitos de asistencia lingüística. Jasperame al [phone].    We comply with applicable federal civil rights laws and [place] laws. We do not discriminate on the basis of race, color, national origin, age, disability, sex, sexual orientation, or gender identity.            Thank you!     Thank you for choosing [facility]  for your care. Our goal is always to provide you with excellent care. Hearing back from our patients is one way we can continue to improve our services. Please take a few minutes to complete the written survey that you may receive in the mail after  your visit with us. Thank you!             Your Updated Medication List - Protect others around you: Learn how to safely use, store and throw away your medicines at www.disposemymeds.org.          This list is accurate as of 9/5/18  5:13 PM.  Always use your most recent med list.                   Brand Name Dispense Instructions for use Diagnosis    ibuprofen 200 MG tablet    ADVIL/MOTRIN    60 tablet    Take 3 tablets (600 mg) by mouth every 6 hours as needed for mild pain or fever        QUEtiapine 50 MG tablet    SEROQUEL    30 tablet    Take 1 tablet (50 mg) by mouth nightly as needed    Autism       * TEGRETOL PO      Take 200 mg by mouth        * carBAMazepine 200 MG 12 hr tablet    TEGRETOL XR    120 tablet    Take 2 tablets (400 mg) by mouth 2 times daily    Seizure disorder (H)       * Notice:  This list has 2 medication(s) that are the same as other medications prescribed for you. Read the directions carefully, and ask your doctor or other care provider to review them with you.

## 2018-09-06 NOTE — PROGRESS NOTES
"Visit Date:   09/05/2018      PSYCHIATRIC MEDICATION MANAGEMENT NOTE      DATE OF VISIT: 09/05/2018      IDENTIFICATION: Mr. Kumar is a 22-year-old Citizen of [place] American male who is followed for intermittent difficult and aggressive behaviors.      HISTORY: During our last evaluation, we started the patient on Seroquel 25 mg and suggested he get a neurologic workup.  It appeared at that time that he may be suffering from autism spectrum disorder.  He certainly has a flat affect and is very isolative with very little social interaction.  His mother reports that he was very active up until age 6, that he walked at age 4, but also talked clearly at age 4.  She believes he had friends.  Unfortunately, she was not around from age 6 until more recently.  One possibility is this patient really does not have autism but rather traumatic brain injury.  The mother reports that they lived in an area that had stones and rocks and every time he would fall with a seizure, he would have a head injury.  She points out multiple scars on his face.  There are also multiple scars on his hands.  He has been referred for Neurology and I believe an MRI. Making an accurate diagnosis is somewhat hindered by the lack of information on his childhood and his multiple head injuries.      It does not appear this family is getting very much social support.  I believe there may be a [facility] . I was concerned that this patient used to go to \"adult school,\" but apparently is not able to go this year.  I asked our  to come in and work with the family.  She will make appropriate phone calls to the [facility] and try to come up with ways to be helpful.      The patient's mother and brother both report that he is sleeping better with the Seroquel, but still having some difficult behaviors.  The patient also thought he was sleeping better, so we went ahead and increased the Seroquel to 50 mg at bedtime.  He will continue on his " anti-seizure medication.      MENTAL STATUS EXAM:  Today, the patient is pleasant and cooperative, but does not initiate any speech.  He certainly responds when spoken to.  The  checked to see if he can actually read some words in Kazakh and he certainly can. He reports he can watch Kazakh television and even read Kazakh books.  It is important to note that this is a great deal different than what the family initially told the [facility] physician which was that he had no schooling and was basically nonverbal.  He actually speaks quite well.  He certainly does have a flat affect.  His mood, however, is neutral. His speech is generally coherent and goal oriented.  Associations tight.  Thought processes appear to be logical and linear.  Content of thought is without psychosis or suicidal ideation.  Recent and remote memory, concentration, fund of knowledge and use of language appear to be at baseline.  He is alert and oriented x 3.  Insight and judgment are somewhat guarded.  Muscle strength and tone appear to be at baseline.  Gait and station are within normal limits.      ASSESSMENT: Autism spectrum disorder versus traumatic brain injury as well as cognitive disorder, not otherwise specified.      RECOMMENDATIONS:  Neurological evaluation, at some point neuropsych testing, social work support, and increase Seroquel to 50 mg p.o. at bedtime.         SUDHA ELAINE MD             D: 2018   T: 2018   MT:       Name:     WOODY MORALES   MRN:      [id number]        Account:      [id number]   :      1995           Visit Date:   2018      Document: [id number]

## 2018-09-12 ENCOUNTER — OFFICE VISIT (OUTPATIENT)
Dept: NEUROLOGY | Facility: CLINIC | Age: 23
End: 2018-09-12
Payer: COMMERCIAL

## 2018-09-12 ENCOUNTER — ALLIED HEALTH/NURSE VISIT (OUTPATIENT)
Dept: NEUROLOGY | Facility: CLINIC | Age: 23
End: 2018-09-12
Payer: COMMERCIAL

## 2018-09-12 VITALS
SYSTOLIC BLOOD PRESSURE: 125 MMHG | TEMPERATURE: 97.1 F | DIASTOLIC BLOOD PRESSURE: 80 MMHG | WEIGHT: 164 LBS | HEART RATE: 66 BPM | BODY MASS INDEX: 28 KG/M2 | HEIGHT: 64 IN

## 2018-09-12 DIAGNOSIS — R56.9 SEIZURE (H): Primary | ICD-10-CM

## 2018-09-12 DIAGNOSIS — G40.211 PARTIAL SYMPTOMATIC EPILEPSY WITH COMPLEX PARTIAL SEIZURES, INTRACTABLE, WITH STATUS EPILEPTICUS (H): ICD-10-CM

## 2018-09-12 DIAGNOSIS — R56.9 SEIZURE (H): ICD-10-CM

## 2018-09-12 DIAGNOSIS — R62.50 DEVELOPMENTAL DELAY, MODERATE: Primary | ICD-10-CM

## 2018-09-12 DIAGNOSIS — G40.909 SEIZURE DISORDER (H): ICD-10-CM

## 2018-09-12 RX ORDER — CARBAMAZEPINE 200 MG/1
400 TABLET, EXTENDED RELEASE ORAL 2 TIMES DAILY
Qty: 120 TABLET | Refills: 11 | Status: SHIPPED | OUTPATIENT
Start: 2018-09-12 | End: 2019-03-19

## 2018-09-12 ASSESSMENT — PAIN SCALES - GENERAL: PAINLEVEL: NO PAIN (0)

## 2018-09-12 NOTE — MR AVS SNAPSHOT
"              After Visit Summary   9/12/2018    René Kumar    MRN: [id number]           Patient Information     Date Of Birth          1995        Visit Information        Provider Department      9/12/2018 1:00 PM [facility]        Today's Diagnoses     Developmental delay, moderate    -  1    Seizure disorder (H)           Follow-ups after your visit        Follow-up notes from your care team     Return in about 6 months (around 3/12/2019).      Your next 10 appointments already scheduled     Mar 19, 2019 11:00 AM CDT   Return Visit with [facility] ([email or web address])    [address], [address]  [facility] [address] [phone]              Future tests that were ordered for you today     Open Future Orders        Priority Expected Expires Ordered    MR Brain w/o Contrast Routine  9/12/2019 9/12/2018            Who to contact     Please call your clinic at [phone] to:    Ask questions about your health    Make or cancel appointments    Discuss your medicines    Learn about your test results    Speak to your doctor            Additional Information About Your Visit        Care EveryWhere ID     This is your Care EveryWhere ID. This could be used by other organizations to access your [facility] medical records  [id number]        Your Vitals Were     Pulse Temperature Height BMI (Body Mass Index)          66 97.1  F (36.2  C) (Temporal) 5' 4\" (162.6 cm) 28.15 kg/m2         Blood Pressure from Last 3 Encounters:   09/12/18 125/80   08/13/18 125/87   06/27/18 100/78    Weight from Last 3 Encounters:   09/12/18 164 lb (74.4 kg)   06/27/18 165 lb 3.2 oz (74.9 kg)   03/29/18 162 lb 9.6 oz (73.8 kg)              We Performed the Following     AST (SGOT)     Basic metabolic panel     Carbamazepine total     Sodium     TSH     Vitamin D Deficiency (Calcifediol)          Where to get your medicines    "   These medications were sent to [id number] [address] AT [place] [address]  [address] [facility] [address]     Phone:  [phone]     carBAMazepine 200 MG 12 hr tablet          Primary Care Provider Fax #    Physician No Ref-Primary [phone]       No address on file        Equal Access to Services     [facility] : Hadii aad ku hadasho Soomaali, waaxda luqadaha, qaybta kaalmada adeegyada, waxay shellyin haykellenn adecodi kotharisalasjessica campos . So [facility] [phone].    ATENCIÓN: Si habla español, tiene a bryant disposición servicios gratuitos de asistencia lingüística. Johan al [phone].    We comply with applicable federal civil rights laws and [place] laws. We do not discriminate on the basis of race, color, national origin, age, disability, sex, sexual orientation, or gender identity.            Thank you!     Thank you for choosing [facility]  for your care. Our goal is always to provide you with excellent care. Hearing back from our patients is one way we can continue to improve our services. Please take a few minutes to complete the written survey that you may receive in the mail after your visit with us. Thank you!             Your Updated Medication List - Protect others around you: Learn how to safely use, store and throw away your medicines at www.disposemymeds.org.          This list is accurate as of 9/12/18  2:41 PM.  Always use your most recent med list.                   Brand Name Dispense Instructions for use Diagnosis    carBAMazepine 200 MG 12 hr tablet    TEGRETOL XR    120 tablet    Take 2 tablets (400 mg) by mouth 2 times daily    Seizure disorder (H)       ibuprofen 200 MG tablet    ADVIL/MOTRIN    60 tablet    Take 3 tablets (600 mg) by mouth every 6 hours as needed for mild pain or fever        QUEtiapine 50 MG tablet    SEROQUEL    30 tablet    Take 1 tablet (50 mg) by mouth nightly as needed    Autism

## 2018-09-12 NOTE — MR AVS SNAPSHOT
After Visit Summary   9/12/2018    René Kumar    MRN: [id number]           Patient Information     Date Of Birth          1995        Visit Information        Provider Department      9/12/2018 9:30 AM [facility]        Today's Diagnoses     Seizure (H)    -  1       Follow-ups after your visit        Your next 10 appointments already scheduled     Mar 19, 2019 11:00 AM CDT   Return Visit with Clay Dyson MD   [facility] ([facility])    [address] [place], [address]  [facility] [address] [phone]              Who to contact     Please call your clinic at [phone] to:    Ask questions about your health    Make or cancel appointments    Discuss your medicines    Learn about your test results    Speak to your doctor            Additional Information About Your Visit        Care EveryWhere ID     This is your Care EveryWhere ID. This could be used by other organizations to access your [facility] medical records  [id number]         Blood Pressure from Last 3 Encounters:   No data found for BP    Weight from Last 3 Encounters:   No data found for Wt              Today, you had the following     No orders found for display         Where to get your medicines      These medications were sent to [facility] [address] [place] [address] AT [address] [address]     Phone:  [phone]     carBAMazepine 200 MG 12 hr tablet          Primary Care Provider Fax #    Physician No Ref-Primary [phone]       No address on file        Equal Access to Services     LINDA LUCERO AH: Hadii adal Barba, waaxda luqadaha, qaybta kaalmada getachew, saleem alejo kylahcodi chan. So [facility] [phone].    ATENCIÓN: Si habla español, tiene a bryant disposición servicios gratuitos de asistencia lingüística. Llame al [phone].    We comply  with applicable federal civil rights laws and [place] laws. We do not discriminate on the basis of race, color, national origin, age, disability, sex, sexual orientation, or gender identity.            Thank you!     Thank you for choosing [facility] EPILEPS[facility]  for your care. Our goal is always to provide you with excellent care. Hearing back from our patients is one way we can continue to improve our services. Please take a few minutes to complete the written survey that you may receive in the mail after your visit with us. Thank you!             Your Updated Medication List - Protect others around you: Learn how to safely use, store and throw away your medicines at www.disposemymeds.org.          This list is accurate as of 9/12/18 11:59 PM.  Always use your most recent med list.                   Brand Name Dispense Instructions for use Diagnosis    carBAMazepine 200 MG 12 hr tablet    TEGRETOL XR    120 tablet    Take 2 tablets (400 mg) by mouth 2 times daily    Seizure disorder (H)       ibuprofen 200 MG tablet    ADVIL/MOTRIN    60 tablet    Take 3 tablets (600 mg) by mouth every 6 hours as needed for mild pain or fever        QUEtiapine 50 MG tablet    SEROQUEL    30 tablet    Take 1 tablet (50 mg) by mouth nightly as needed    Autism

## 2018-09-12 NOTE — LETTER
"2018     RE: René Kumar  : 1995   MRN: [id number]      Dear Colleague,    Thank you for referring your patient, René Kumar, to the [facility] EPILEPS[facility]ARE at [facility]. Please see a copy of my visit note below.    [facility] EPILEPSY C[facility] NEW PATIENT EVALUATION    Service Date: 2018      CLINICAL HISTORY:  A 22-year-old right-handed male referred by Psychiatry (Dr. Chambers) for ongoing treatment of epileptic seizures.  Neither patient nor family speaks English and history was obtained through a .      HISTORY OF PRESENT ILLNESS:  Mother reports that he was born in [facility].  She reports that early development (through about 1 year) was normal.  They were then .  She heard about the information indirectly from family relatives.  According to relatives, seizures started at around age 10.  He had quite frequent seizures including the daily convulsive seizures for a period of time.  He was treated with anti-seizure medications, the identity of which was unclear, but mother states \"they were not effective.\"  He came to this country at age 21 and was initially seen in [place].  Mom states that they were unfamiliar with medications he was taking.  Carbamazepine was started at that time.  He had a single seizure and then the seizures have stopped.  He has been seizure-free for the last 9 months.      The patient denies a warning for seizures.  Family describes fall, stiffness and shaking.  He is unresponsive with tongue bite and urinary incontinence.  There has been some fecal incontinence as well.  Duration is minutes.  Postictally, he is sleepy and unresponsive.  Family denies lesser spells of staring with automatic activity or lesser spells of collapse or jerking.       The family reports that patient is irritable, has low frustration tolerance and his oppositional and defiant.  He will get angry over trivial matters.  There has been no " directed violence.  He will often go to his room and talk to himself.  However, when parents talk to him, he can be distracted.      RISK FACTORS FOR EPILEPSY:  Mom denies significant birth traumas.  Mom reports that he developed well up until age 10 and is not aware of any febrile seizures in that period of time.  She is not aware of strokes, tumors, significant head trauma.  He does not abuse street drugs or alcohol.  Mom admits that she is not completely aware of what transpired between age 3 and 21, because she was not with him during that period of time.      PREVIOUS EVALUATIONS FOR EPILEPSY:  As best as we can tell, he has had no cerebral imaging.  Mom does not believe that he had cerebral imaging in [place].  He did have a 3-hour video EEG performed today in clinic.  A 6 Hz posterior dominant rhythm was seen.  Epileptiform discharges were seen in the parasagittal regions bilaterally during wakefulness.  There was 1 brief run of 6 Hz spike wave in the left posterior hemisphere.  During sleep, independent bitemporal epileptiform discharges were seen, which were much more common on the left than on the right.  There was no focal slowing.      CURRENT MEDICATIONS:   1.  Carbamazepine extended release (200 mg) 400 mg b.i.d.   2.  Quetiapine 50 mg.     Family denies side effects with these medications.  Only available level was 14.  He was treated with other medications while in [facility], but parents are not aware of their identity.  He has not been treated with other medications in the [place].      PAST MEDICAL HISTORY:   1.  No known medical allergies.   2.  Family denies hypertension, diabetes, lung, liver, kidney or heart disease.      FAMILY HISTORY:  No family history of seizures.  Psychiatry notes that there is a family history of ASD (autism spectrum disorder).      PSYCHOSOCIAL HISTORY:  He lives at home with family.  He is not currently working.  He is being followed by Psychiatry in the [facility] disorder.  Question of ASD was raised.  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      REVIEW OF SYSTEMS:  The family reports occasional mild headaches, but these are not particularly troublesome.  There is no dysphagia.  There is no cough, wheezing or hemoptysis.  He does not smoke.  There is no chest pain or leg swelling.  Family denies flank pain or kidney stones.  He apparently had a recent UTI that responded to antibiotics.  Family denies recent fractures, though he apparently sustained 1 while in [place].      PHYSICAL EXAMINATION:  Height 5 feet 4 inches, weight 164 pounds (74.4 kg), BMI 28.21.  Blood pressure 125/80, pulse 66 and regular.  Afebrile.  Heart exam without murmur.  Regular rate and rhythm.   Lungs are clear.     NEUROLOGIC EXAMINATION:  He is oriented to the day.  When asked month and the year.  He states, \"I do not pay attention to the months or the year.\"  He is able to count forward from 1 to 20 in Bruneian and count backward from 20 to 1 correctly, according to the .  He was able to repeat 3 words through  (Bruneian translation of boat, shoe, ) and was able to recall those 3 items after 5 minutes.  He is able to draw intersecting pentagons.  There is no clear apraxia.  He is somewhat uncooperative and oppositional, but appears to be able to follow simple commands.  He was asked to write a simple sentence in a Br[place]ian.  According to the , there were multiple grammatical mistakes and misspellings, though the general content was correct.   Visual fields appear to be full.  Extraocular movements intact.  Smile symmetrical.  There is no drift, pronation or tremor.  Finger-finger-nose is done well.  Reflexes are difficult to obtain.  Plantar responses go down bilaterally.  Vibratory sensation is preserved and pinprick is preserved as well.      IMPRESSION:   1.  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  " This appears to have responded dramatically to carbamazepine.  Prior to that, he reportedly had very frequent seizures.   2.  Congenital encephalopathy per EEG.  Appears to have mild developmental delay and possibly ASD by examination and EEG.  Mental status examination is challenging given the language barrier.  He is, however, clearly verbal and can write.      PLAN:   1.  Anticonvulsant blood levels today to confirm compliance and rule out toxicity.  CBC, SGOT, sodium as baseline.   2.  Thyroid functions, vitamin D level.  First to rule out easily remediable cause of cognitive impairment, second to assess for what is a systemic problem in this population.   3.  MRI of brain, seizure protocol.  We spent some time trying to sort through with family whether they felt he could not lay still on an MRI scan bore and they felt that he could.   4.  Return to clinic in 6 months.   5.  We discussed the results of EEG and nature of epilepsy.  We discussed laws regarding driving in the [facility].  We discussed avoidance of bathtub baths, swimming and other activities in which sudden loss of consciousness or tone could pose danger to himself or others.  They were urged to call if he experiences any further seizures.  Multiple other anticonvulsant medication options are available.  Lamotrigine, oxcarbazepine, lacosamide, perhaps levetiracetam could be contemplated as needed.   6.  Continue followup with Psychiatry.         MARITZA CROUCH MD        D: 2018   T: 2018   MT: LUIS F    Name:     WOODY MORALES   MRN:      [id number]        Account:      [id number]   :      1995           Service Date: 2018    Document: [id number]

## 2018-09-13 NOTE — PROGRESS NOTES
"[facility] EPILEPSY CARE NEW PATIENT EVALUATION    Service Date: 09/12/2018      CLINICAL HISTORY:  A 22-year-old right-handed male referred by Psychiatry (Dr. Chambers) for ongoing treatment of epileptic seizures.  Neither patient nor family speaks English and history was obtained through a .      HISTORY OF PRESENT ILLNESS:  Mother reports that he was born in \[facility]".  She reports that early development (through about 1 year) was normal.  They were then .  She heard about the information indirectly from family relatives.  According to relatives, seizures started at around age 10.  He had quite frequent seizures including the daily convulsive seizures for a period of time.  He was treated with anti-seizure medications, the identity of which was unclear, but mother states \"they were not effective.\"  He came to this country at age 21 and was initially seen in [place].  Mom states that they were unfamiliar with medications he was taking.  Carbamazepine was started at that time.  He had a single seizure and then the seizures have stopped.  He has been seizure-free for the last 9 months.      The patient denies a warning for seizures.  Family describes fall, stiffness and shaking.  He is unresponsive with tongue bite and urinary incontinence.  There has been some fecal incontinence as well.  Duration is minutes.  Postictally, he is sleepy and unresponsive.  Family denies lesser spells of staring with automatic activity or lesser spells of collapse or jerking.       The family reports that patient is irritable, has low frustration tolerance and his oppositional and defiant.  He will get angry over trivial matters.  There has been no directed violence.  He will often go to his room and talk to himself.  However, when parents talk to him, he can be distracted.      RISK FACTORS FOR EPILEPSY:  Mom denies significant birth traumas.  Mom reports that he developed well up until age 10 and is not aware of any " febrile seizures in that period of time.  She is not aware of strokes, tumors, significant head trauma.  He does not abuse street drugs or alcohol.  Mom admits that she is not completely aware of what transpired between age 3 and 21, because she was not with him during that period of time.      PREVIOUS EVALUATIONS FOR EPILEPSY:  As best as we can tell, he has had no cerebral imaging.  Mom does not believe that he had cerebral imaging in [place].  He did have a 3-hour video EEG performed today in clinic.  A 6 Hz posterior dominant rhythm was seen.  Epileptiform discharges were seen in the parasagittal regions bilaterally during wakefulness.  There was 1 brief run of 6 Hz spike wave in the left posterior hemisphere.  During sleep, independent bitemporal epileptiform discharges were seen, which were much more common on the left than on the right.  There was no focal slowing.      CURRENT MEDICATIONS:   1.  Carbamazepine extended release (200 mg) 400 mg b.i.d.   2.  Quetiapine 50 mg.     Family denies side effects with these medications.  Only available level was 14.  He was treated with other medications while in [facility], but parents are not aware of their identity.  He has not been treated with other medications in the [place].      PAST MEDICAL HISTORY:   1.  No known medical allergies.   2.  Family denies hypertension, diabetes, lung, liver, kidney or heart disease.      FAMILY HISTORY:  No family history of seizures.  Psychiatry notes that there is a family history of ASD (autism spectrum disorder).      PSYCHOSOCIAL HISTORY:  He lives at home with family.  He is not currently working.  He is being followed by Psychiatry in the [facility] for behavior disorder.  Question of ASD was raised.  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      REVIEW OF SYSTEMS:  The family reports occasional mild headaches, but these are not particularly troublesome.  There is no dysphagia.  There is no cough, wheezing or  "hemoptysis.  He does not smoke.  There is no chest pain or leg swelling.  Family denies flank pain or kidney stones.  He apparently had a recent UTI that responded to antibiotics.  Family denies recent fractures, though he apparently sustained 1 while in [place].      PHYSICAL EXAMINATION:  Height 5 feet 4 inches, weight 164 pounds (74.4 kg), BMI 28.21.  Blood pressure 125/80, pulse 66 and regular.  Afebrile.  Heart exam without murmur.  Regular rate and rhythm.   Lungs are clear.     NEUROLOGIC EXAMINATION:  He is oriented to the day.  When asked month and the year.  He states, \"I do not pay attention to the months or the year.\"  He is able to count forward from 1 to 20 in Sammarinese and count backward from 20 to 1 correctly, according to the .  He was able to repeat 3 words through  (Sammarinese translation of boat, shoe, staff) and was able to recall those 3 items after 5 minutes.  He is able to draw intersecting pentagons.  There is no clear apraxia.  He is somewhat uncooperative and oppositional, but appears to be able to follow simple commands.  He was asked to write a simple sentence in a Sammarinese.  According to the , there were multiple grammatical mistakes and misspellings, though the general content was correct.   Visual fields appear to be full.  Extraocular movements intact.  Smile symmetrical.  There is no drift, pronation or tremor.  Finger-finger-nose is done well.  Reflexes are difficult to obtain.  Plantar responses go down bilaterally.  Vibratory sensation is preserved and pinprick is preserved as well.      IMPRESSION:   1.  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  This appears to have responded dramatically to carbamazepine.  Prior to that, he reportedly had very frequent seizures.   2.  Congenital encephalopathy per EEG.  Appears to have mild developmental delay and possibly ASD by examination and EEG.  Mental status examination is " challenging given the language barrier.  He is, however, clearly verbal and can write.      PLAN:   1.  Anticonvulsant blood levels today to confirm compliance and rule out toxicity.  CBC, SGOT, sodium as baseline.   2.  Thyroid functions, vitamin D level.  First to rule out easily remediable cause of cognitive impairment, second to assess for what is a systemic problem in this population.   3.  MRI of brain, seizure protocol.  We spent some time trying to sort through with family whether they felt he could not lay still on an MRI scan bore and they felt that he could.   4.  Return to clinic in 6 months.   5.  We discussed the results of EEG and nature of epilepsy.  We discussed laws regarding driving in the [facility].  We discussed avoidance of bathtub baths, swimming and other activities in which sudden loss of consciousness or tone could pose danger to himself or others.  They were urged to call if he experiences any further seizures.  Multiple other anticonvulsant medication options are available.  Lamotrigine, oxcarbazepine, lacosamide, perhaps levetiracetam could be contemplated as needed.   6.  Continue followup with Psychiatry.         MARITZA CROUCH MD             D: 2018   T: 2018   MT: LUIS F      Name:     WOODY MORALES   MRN:      [id number]        Account:      [id number]   :      1995           Service Date: 2018      Document: [id number]

## 2018-09-13 NOTE — PROCEDURES
DATE OF STUDY:  2018      TYPE OF STUDY:  Outpatient video EEG.      EEG #:  [id number]    DURATION OF RECORDIN hours, 12 minutes, 49 seconds.     HISTORY:  Outpatient video EEG monitoring on René Kumar, a 22-year-old who may suffer from autistic spectrum disorder.  There have been multiple head injuries.  There have been periods of altered mental status.  Family reports a history of epilepsy as well.  He is being treated for seizures with carbamazepine.    TECHNICAL SUMMARY: This continuous video- EEG monitoring procedure was performed with 23 scalp electrodes in 10-20 electrode system placements, and additional scalp, precordial and other surface electrodes used for electrical referencing and artifact detection.  Video monitoring was utilized and periodically reviewed by EEG technologists and the physician for electroclinical correlations.     FINDINGS:  A 6 Hz posterior dominant rhythm while awake.  This is reactive.  Slower frequencies are seen in the central regions.  The patient does descend into sleep with disruption of the posterior dominant rhythm.  Poorly formed vertex activity is seen.  Poorly formed sleep spindles are seen at times as well.  There is no clear focal slowing.      INTERICTAL ABNORMALITIES:  Intermittent spikes are seen over the parasagittal regions bilaterally, especially when the patient is awake.  There are several brief (less than 500 milliseconds) runs of 6 Hz poorly developed spike wave seen mostly over the left posterior hemisphere.  These are perhaps more common while the patient is awake.  During sleep, fairly frequent left mid to posterior temporal spikes and sharp waves are seen (e.g. 10:41:36 a.m.).  Rarely these are seen over the right temporal region as well (e.g. 11:15:04 a.m.).      ICTAL:  No electrographic seizures.      IMPRESSION:  Abnormal.  Multifocal epileptiform activity is seen.  These findings indicate that there is a high likelihood that the  patient experiences focal epileptic seizures.  Overt seizures, however, were not seen in this study.      Background activity is consistent with a moderate diffuse encephalopathy.  This is nonspecific and can be seen in a variety of etiologies including congenital toxic metabolic, etc.      MD MARITZA Sandoval MD             D: 2018   T: 2018   MT: al      Name:     WOODY MORALES   MRN:      [id number]        Account:        [id number]   :      1995           Procedure Date: 2018      Document: [id number]

## 2018-09-19 ENCOUNTER — TRANSFERRED RECORDS (OUTPATIENT)
Dept: HEALTH INFORMATION MANAGEMENT | Facility: CLINIC | Age: 23
End: 2018-09-19

## 2018-09-19 DIAGNOSIS — G40.909 SEIZURE DISORDER (H): ICD-10-CM

## 2018-09-19 DIAGNOSIS — R62.50 DEVELOPMENTAL DELAY, MODERATE: ICD-10-CM

## 2018-09-19 LAB
ANION GAP SERPL CALCULATED.3IONS-SCNC: 12 MMOL/L (ref 3–14)
AST SERPL W P-5'-P-CCNC: 23 U/L (ref 0–45)
BUN SERPL-MCNC: 7 MG/DL (ref 7–30)
CALCIUM SERPL-MCNC: 9 MG/DL (ref 8.5–10.1)
CARBAMAZEPINE SERPL-MCNC: 12.3 MG/L (ref 4–12)
CHLORIDE SERPL-SCNC: 105 MMOL/L (ref 94–109)
CO2 SERPL-SCNC: 26 MMOL/L (ref 20–32)
CREAT SERPL-MCNC: 0.77 MG/DL (ref 0.66–1.25)
GFR SERPL CREATININE-BSD FRML MDRD: >90 ML/MIN/1.7M2
GLUCOSE SERPL-MCNC: 114 MG/DL (ref 70–99)
POTASSIUM SERPL-SCNC: 4.2 MMOL/L (ref 3.4–5.3)
SODIUM SERPL-SCNC: 143 MMOL/L (ref 133–144)
TSH SERPL DL<=0.005 MIU/L-ACNC: 3.8 MU/L (ref 0.4–4)

## 2018-09-20 LAB — DEPRECATED CALCIDIOL+CALCIFEROL SERPL-MC: 22 UG/L (ref 20–75)

## 2018-10-08 ENCOUNTER — PATIENT OUTREACH (OUTPATIENT)
Dept: CARE COORDINATION | Facility: CLINIC | Age: 23
End: 2018-10-08

## 2018-10-08 ENCOUNTER — OFFICE VISIT (OUTPATIENT)
Dept: INTERNAL MEDICINE | Facility: CLINIC | Age: 23
End: 2018-10-08
Payer: COMMERCIAL

## 2018-10-08 VITALS
SYSTOLIC BLOOD PRESSURE: 112 MMHG | DIASTOLIC BLOOD PRESSURE: 72 MMHG | WEIGHT: 168.4 LBS | HEIGHT: 64 IN | TEMPERATURE: 98.1 F | RESPIRATION RATE: 16 BRPM | HEART RATE: 86 BPM | BODY MASS INDEX: 28.75 KG/M2 | OXYGEN SATURATION: 95 %

## 2018-10-08 DIAGNOSIS — G40.211 PARTIAL SYMPTOMATIC EPILEPSY WITH COMPLEX PARTIAL SEIZURES, INTRACTABLE, WITH STATUS EPILEPTICUS (H): Primary | ICD-10-CM

## 2018-10-08 DIAGNOSIS — F09 COGNITIVE DISORDER: ICD-10-CM

## 2018-10-08 DIAGNOSIS — Z23 NEED FOR VACCINATION: ICD-10-CM

## 2018-10-08 PROCEDURE — 99213 OFFICE O/P EST LOW 20 MIN: CPT | Mod: 25 | Performed by: NURSE PRACTITIONER

## 2018-10-08 PROCEDURE — 90715 TDAP VACCINE 7 YRS/> IM: CPT | Performed by: NURSE PRACTITIONER

## 2018-10-08 PROCEDURE — 90471 IMMUNIZATION ADMIN: CPT | Performed by: NURSE PRACTITIONER

## 2018-10-08 NOTE — NURSING NOTE
Screening Questionnaire for Adult Immunization    Are you sick today?   No   Do you have allergies to medications, food, a vaccine component or latex?   No   Have you ever had a serious reaction after receiving a vaccination?   No   Do you have a long-term health problem with heart disease, lung disease, asthma, kidney disease, metabolic disease (e.g. diabetes), anemia, or other blood disorder?   No   Do you have cancer, leukemia, HIV/AIDS, or any other immune system problem?   No   In the past 3 months, have you taken medications that affect  your immune system, such as prednisone, other steroids, or anticancer drugs; drugs for the treatment of rheumatoid arthritis, Crohn s disease, or psoriasis; or have you had radiation treatments?   No   Have you had a seizure, or a brain or other nervous system problem?   No   During the past year, have you received a transfusion of blood or blood     products, or been given immune (gamma) globulin or antiviral drug?   No   For women: Are you pregnant or is there a chance you could become        pregnant during the next month?   No   Have you received any vaccinations in the past 4 weeks?   No     Immunization questionnaire answers were all negative.        Per orders of Teagan Mckeon,CNP injection of Tdap given by Courtney Gan. Patient instructed to remain in clinic for 15 minutes afterwards, and to report any adverse reaction to me immediately.       Screening performed by Courtney Gan on 10/8/2018 at 4:23 PM.

## 2018-10-08 NOTE — MR AVS SNAPSHOT
After Visit Summary   10/8/2018    René Kumar    MRN: [id number]           Patient Information     Date Of Birth          1995        Visit Information        Provider Department      10/8/2018 3:05 PM Teagan Mckeon NP; [employer] [facility]        Today's Diagnoses     Partial symptomatic epilepsy with complex partial seizures, intractable, with status epilepticus (H)    -  1    Cognitive disorder        Need for vaccination           Follow-ups after your visit        Follow-up notes from your care team     Return if symptoms worsen or fail to improve.      Your next 10 appointments already scheduled     Mar 19, 2019 11:00 AM CDT   Return Visit with Clay Dyson MD   [facility] ([facility])    [address], [address]  [facility] [address] [phone]              Who to contact     If you have questions or need follow up information about today's clinic visit or your schedule please contact [facility] directly at [phone].  Normal or non-critical lab and imaging results will be communicated to you by MyChart, letter or phone within 4 business days after the clinic has received the results. If you do not hear from us within 7 days, please contact the clinic through MyChart or phone. If you have a critical or abnormal lab result, we will notify you by phone as soon as possible.  Submit refill requests through [employer] or call your pharmacy and they will forward the refill request to us. Please allow 3 business days for your refill to be completed.          Additional Information About Your Visit        Care EveryWhere ID     This is your Care EveryWhere ID. This could be used by other organizations to access your [place] medical records  [id number]        Your Vitals Were     Pulse Temperature Respirations Height Pulse Oximetry BMI (Body Mass Index)    86 98.1  F (36.7  C)  "(Oral) 16 5' 4\" (1.626 m) 95% 28.91 kg/m2       Blood Pressure from Last 3 Encounters:   10/08/18 112/72   09/12/18 125/80   08/13/18 125/87    Weight from Last 3 Encounters:   10/08/18 168 lb 6.4 oz (76.4 kg)   09/12/18 164 lb (74.4 kg)   06/27/18 165 lb 3.2 oz (74.9 kg)              We Performed the Following     1st  Administration  [85002]     TDAP VACCINE (ADACEL) [77026.002]        Primary Care Provider Fax #    Physician No Ref-Primary [phone]       No address on file        Equal Access to Services     LINDA LUCERO : Facundo Barba, clementine ruiz, elba chilel, saleem campos . So [facility] [phone].    ATENCIÓN: Si habla español, tiene a bryant disposición servicios gratuitos de asistencia lingüística. Llame al [phone].    We comply with applicable federal civil rights laws and [place] laws. We do not discriminate on the basis of race, color, national origin, age, disability, sex, sexual orientation, or gender identity.            Thank you!     Thank you for choosing [facility]  for your care. Our goal is always to provide you with excellent care. Hearing back from our patients is one way we can continue to improve our services. Please take a few minutes to complete the written survey that you may receive in the mail after your visit with us. Thank you!             Your Updated Medication List - Protect others around you: Learn how to safely use, store and throw away your medicines at www.disposemymeds.org.          This list is accurate as of 10/8/18  4:29 PM.  Always use your most recent med list.                   Brand Name Dispense Instructions for use Diagnosis    carBAMazepine 200 MG 12 hr tablet    TEGRETOL XR    120 tablet    Take 2 tablets (400 mg) by mouth 2 times daily    Seizure disorder (H)       ibuprofen 200 MG tablet    ADVIL/MOTRIN    60 tablet    Take 3 tablets (600 mg) by mouth every 6 hours as needed for " mild pain or fever        QUEtiapine 50 MG tablet    SEROQUEL    30 tablet    Take 1 tablet (50 mg) by mouth nightly as needed    Autism

## 2018-10-08 NOTE — PROGRESS NOTES
Clinic Care Coordination Contact    Clinic Care Coordination Contact  OUTREACH    Referral Information:  Referral Source: PCP    Primary Diagnosis: Psychosocial    Chief Complaint   Patient presents with     Clinic Care Coordination - Initial     PCA navigation        Universal Utilization: No established PCP.  Difficulty keeping appointments: No  Compliance Concerns: No  No-Show Concerns: No  No PCP office visit in Past Year: No  Utilization    Last refreshed: 10/8/2018  4:01 PM:  No Show Count (past year) 0       Last refreshed: 10/8/2018  4:01 PM:  ED visits 2       Last refreshed: 10/8/2018  4:01 PM:  Hospital admissions 0          Current as of: 10/8/2018  4:01 PM           Psychosocial: Patient and family are interested in setting up PCA services for Patient. Per chart review, Patient has kole WISE. Met with Patient and family and . Encouraged Pt and family to contact [facility] and have a copy of the PCA screening form faxed to the clinic. Phone number and fax number for the clinic provided to Patient and family. They request notification once the form is successfully completed and returned to [facility]. We discussed the process to getting a PCA set up and they express agreement with this plan.      Patient/Caregiver understanding: Pt reports understanding and denies any additional questions or concerns at this times. MEDHAT CC engaged in AIDET communication during encounter.    Outreach Frequency: monthly  Future Appointments              In 5 months Clay Dyson MD [facility] Ep[facility]psy [facility]          Plan: MED[facility]AT CC will remain available for questions about setting up PCA services and navigating this process. MEDHAT CC will outreach to Pt and family again in one month.    George Ryan [facility]  Clinic Care Coordinator  Ph. [phone]  [email or web address]

## 2018-10-08 NOTE — PROGRESS NOTES
SUBJECTIVE:   René Kumar is a 22 year old male who presents to clinic today for the following health issues:      F/u neurology consult and brain MRI  Doing well on medications  Has f/u with psychiatry March 2019  Family interested in nurse evaluation for PCA for pt.        Problem list and histories reviewed & adjusted, as indicated.  Additional history: as documented    Patient Active Problem List   Diagnosis     Seizure (H)     Cognitive disorder     Partial symptomatic epilepsy with complex partial seizures, intractable, with status epilepticus (H)     Past Surgical History:   Procedure Laterality Date     NO HISTORY OF SURGERY         Social History   Substance Use Topics     Smoking status: Never Smoker     Smokeless tobacco: Never Used     Alcohol use No     History reviewed. No pertinent family history.      Current Outpatient Prescriptions   Medication Sig Dispense Refill     carBAMazepine (TEGRETOL XR) 200 MG 12 hr tablet Take 2 tablets (400 mg) by mouth 2 times daily 120 tablet 11     ibuprofen (ADVIL/MOTRIN) 200 MG tablet Take 3 tablets (600 mg) by mouth every 6 hours as needed for mild pain or fever 60 tablet 0     QUEtiapine (SEROQUEL) 50 MG tablet Take 1 tablet (50 mg) by mouth nightly as needed 30 tablet 1     BP Readings from Last 3 Encounters:   10/08/18 112/72   09/12/18 125/80   08/13/18 125/87    Wt Readings from Last 3 Encounters:   10/08/18 168 lb 6.4 oz (76.4 kg)   09/12/18 164 lb (74.4 kg)   06/27/18 165 lb 3.2 oz (74.9 kg)                    Reviewed and updated as needed this visit by clinical staff  Tobacco  Allergies  Meds  Med Hx  Surg Hx  Fam Hx  Soc Hx      Reviewed and updated as needed this visit by Provider         ROS:  Constitutional, HEENT, cardiovascular, pulmonary, gi and gu systems are negative, except as otherwise noted.    OBJECTIVE:     /72 (BP Location: Right arm, Patient Position: Sitting, Cuff Size: Adult Large)  Pulse 86  Temp 98.1  F (36.7  C)  "(Oral)  Resp 16  Ht 5' 4\" (1.626 m)  Wt 168 lb 6.4 oz (76.4 kg)  SpO2 95%  BMI 28.91 kg/m2  Body mass index is 28.91 kg/(m^2).  GENERAL: healthy, alert and no distress  PSYCH: affect flat, cooperative, non verbal today        ASSESSMENT/PLAN:               ICD-10-CM    1. Partial symptomatic epilepsy with complex partial seizures, intractable, with status epilepticus (H) G40.211    2. Cognitive disorder F09        LSW in to see pt for available services  Continue f/u with neuro and psych     A total of 15 minutes was spent with the patient today, with greater than 50% of the visit involving counseling and coordination of care.      Teagan Mckeon, NUZHAT  [facility]    "

## 2018-11-09 ENCOUNTER — PATIENT OUTREACH (OUTPATIENT)
Dept: CARE COORDINATION | Facility: CLINIC | Age: 23
End: 2018-11-09

## 2018-11-09 NOTE — PROGRESS NOTES
Clinic Care Coordination Contact  [facility]/Voicemail       Clinical Data: Care Coordinator Outreach  Outreach attempted x 1.  In coordination with Zambian , left message on voicemail with call back information and requested return call.  Plan: Care Coordinator will try to reach patient again in 1-2 business days.    George Ryan, [facility]  Clinic Care Coordinator  Ph. [phone]  [email or web address]

## 2018-11-13 ENCOUNTER — PATIENT OUTREACH (OUTPATIENT)
Dept: CARE COORDINATION | Facility: CLINIC | Age: 23
End: 2018-11-13

## 2018-11-13 NOTE — PROGRESS NOTES
Clinic Care Coordination Contact  [facility]/Voicemail       Clinical Data: Care Coordinator Outreach  Outreach attempted x 2.  Left message on voicemail with call back information and requested return call.  Plan: Care Coordinator will mail out care coordination introduction letter with care coordinator contact information and explanation of care coordination services. Care Coordinator will do no further outreaches at this time.    George Ryan [facility]  Clinic Care Coordinator  Ph. [phone]  [email or web address]

## 2018-11-13 NOTE — LETTER
Art CARE COORDINATION  No address on file  November 13, 2018    René Kumar  [address]   [place] [address]      Tru Walsh caafimaadka ee ka issacqeeya Isbitaalka [facility]. Tru greene isku dayay inaan waco oo aan awoodin in aan kuu gaaro. Waxaanu ku durga aragnay yumiko trever ka hor kiliinikada, waxaanan doonayay inaan raaco si aan u arko haddii aad u baahan tahay wax caawimaad ah oo ku saabsan sameynta PCA (adeegga daryeelka qofeed) adeegyada caymiskaaga.    Jaenne lundberg xor u tahay inaad paige durga xiriirto [phone], wixii bryant'aaramy chan. Anaga [facility] diiradda saareysaa helitaanka khibrada mohiniyeeldelia caafimaadka ugu sarreysa mannyurtogadmitri ah iyo in [facility] ay sarau anastasiiabmaajumana adharia.    Si aramis chan,    George Ryan, [facility]  Clinic Care Coordinator  Ph. [phone]  [email or web address]    Astaamaruchi: Tru mendezo waldo monk. Jeanne mendezo ii [facility] wixii bryant'aaramy chan.

## 2018-11-15 ENCOUNTER — OFFICE VISIT (OUTPATIENT)
Dept: NEUROLOGY | Facility: CLINIC | Age: 23
End: 2018-11-15
Payer: COMMERCIAL

## 2018-11-15 VITALS
HEART RATE: 71 BPM | DIASTOLIC BLOOD PRESSURE: 92 MMHG | BODY MASS INDEX: 28.67 KG/M2 | SYSTOLIC BLOOD PRESSURE: 148 MMHG | TEMPERATURE: 97.7 F | WEIGHT: 167 LBS

## 2018-11-15 DIAGNOSIS — G40.909 SEIZURE DISORDER (H): Primary | ICD-10-CM

## 2018-11-15 ASSESSMENT — PAIN SCALES - GENERAL: PAINLEVEL: NO PAIN (0)

## 2018-11-15 NOTE — PATIENT INSTRUCTIONS
Get vitamin D 1000 international unit(s) from pharmacy. Take one capsule twice per day.  Keep taking your tegretol XR the way you are now.    MRI was normal.    Keep previous appointment.

## 2018-11-15 NOTE — MR AVS SNAPSHOT
After Visit Summary   11/15/2018    René Kumar    MRN: [id number]           Patient Information     Date Of Birth          1995        Visit Information        Provider Department      11/15/2018 3:00 PM Clay Dyson MD [facility]        Care Instructions        Get vitamin D 1000 international unit(s) from pharmacy. Take one capsule twice per day.  Keep taking your tegretol XR the way you are now.    MRI was normal.    Keep previous appointment.          Follow-ups after your visit        Your next 10 appointments already scheduled     Mar 19, 2019 11:00 AM CDT   Return Visit with Clay Dyson MD   [facility] ([facility])    [address], [address]  [facility] [address] [phone]              Who to contact     Please call your clinic at [phone] to:    Ask questions about your health    Make or cancel appointments    Discuss your medicines    Learn about your test results    Speak to your doctor            Additional Information About Your Visit        MyChart Information     [employer] is an electronic gateway that provides easy, online access to your medical records. With [employer], you can request a clinic appointment, read your test results, renew a prescription or communicate with your care team.     To sign up for [employer] visit the website at [email or web address]   You will be asked to enter the access code listed below, as well as some personal information. Please follow the directions to create your username and password.     Your access code is: [email or web address]  Expires: 2019  4:04 PM     Your access code will  in 90 days. If you need help or a new code, please contact your [facility] or call [phone] for assistance.        Care EveryWhere ID     This is your Care EveryWhere ID. This could be used by other organizations to access your [facility]  records  [id number]        Your Vitals Were     Pulse Temperature BMI (Body Mass Index)             71 97.7  F (36.5  C) 28.67 kg/m2          Blood Pressure from Last 3 Encounters:   11/15/18 (!) 148/92   10/08/18 112/72   09/12/18 125/80    Weight from Last 3 Encounters:   11/15/18 167 lb (75.8 kg)   10/08/18 168 lb 6.4 oz (76.4 kg)   09/12/18 164 lb (74.4 kg)              Today, you had the following     No orders found for display       Primary Care Provider Fax #    Physician No Ref-Primary [phone]       No address on file        Equal Access to Services     LINDA LUCERO : Facundo Barba, clementine ruiz, elba chilel, saleem campos . So [facility] [phone].    ATENCIÓN: Si habla español, tiene a bryant disposición servicios gratuitos de asistencia lingüística. Llame al [phone].    We comply with applicable federal civil rights laws and [place] laws. We do not discriminate on the basis of race, color, national origin, age, disability, sex, sexual orientation, or gender identity.            Thank you!     Thank you for choosing [facility] EPILEPS[facility]  for your care. Our goal is always to provide you with excellent care. Hearing back from our patients is one way we can continue to improve our services. Please take a few minutes to complete the written survey that you may receive in the mail after your visit with us. Thank you!             Your Updated Medication List - Protect others around you: Learn how to safely use, store and throw away your medicines at www.disposemymeds.org.          This list is accurate as of 11/15/18  4:04 PM.  Always use your most recent med list.                   Brand Name Dispense Instructions for use Diagnosis    carBAMazepine 200 MG 12 hr tablet    TEGRETOL XR    120 tablet    Take 2 tablets (400 mg) by mouth 2 times daily    Seizure disorder (H)       ibuprofen 200 MG tablet    ADVIL/MOTRIN    60 tablet    Take 3 tablets  (600 mg) by mouth every 6 hours as needed for mild pain or fever        QUEtiapine 50 MG tablet    SEROQUEL    30 tablet    Take 1 tablet (50 mg) by mouth nightly as needed    Autism

## 2018-11-15 NOTE — PROGRESS NOTES
[facility]psy [facility] Progress Note      Patient:  René Kumar  :  1995   Age:  22 year old   Today's Office Visit:  11/15/2018    Epilepsy Data:    Patient History  Primary Epileptologist/Provider: Clay Dyson M.D.  Patient Status: Controlled  Epilepsy Syndrome: Localization-related epilepsy unspecified  Epilepsy Syndrome Status: Undetermined - Evaluation in Progress  Age of Onset: Age 10  Etiology  : Unknown  Other Relevant Dx/ Issues: suspected ASD     Tests/Surgery History  Last EE2018    Seizure Record  Current Visit Date: 11/15/18  Previous Visit Date: 18  Months since last visit: 2.1    Seizure Type 1: Tonic-clonic seizures  Description of Sz Type 1: family describes fall, stiffening and shaking. he is unresponsive with tongue bite and urinary incontinence.  # of Type 1 Seizure since last visit: 0  Freq. Type 1 / Month: 0    History of Present Illness:     No seizures since last seen.  No seizures since carbamazepine started.  Family presents today to learn about MRI results and to ask regarding home help.      Current Outpatient Prescriptions   Medication Sig Dispense Refill     carBAMazepine (TEGRETOL XR) 200 MG 12 hr tablet Take 2 tablets (400 mg) by mouth 2 times daily 120 tablet 11     ibuprofen (ADVIL/MOTRIN) 200 MG tablet Take 3 tablets (600 mg) by mouth every 6 hours as needed for mild pain or fever 60 tablet 0     QUEtiapine (SEROQUEL) 50 MG tablet Take 1 tablet (50 mg) by mouth nightly as needed 30 tablet 1        Medication Notes:  No side effects.      AED Medication Compliance:  compliant all of the time  Using a pill box:  Medications are administered to patient.    Review of Systems  Have you experienced a traumatic fall since your last visit: NO  Are these falls related to your seizures: Not Applicable    Other Issues:    Brother wonders whether he can any help at home.  Cannot take medication reliably on his own. Afraid to be at home on his own, needs  mother nearby.  Can dress himself but not choose clothes well. Can feed himself, but cannot organize own meals.  Speaks in Mexican and makes basic needs known. Toilets well, has not had accidents.    Is patient safe to drive:  No    Exam:    BP (!) 148/92 (BP Location: Right arm, Patient Position: Chair, Cuff Size: Adult Regular)  Pulse 71  Temp 97.7  F (36.5  C)  Wt 167 lb (75.8 kg)  BMI 28.67 kg/m2     Wt Readings from Last 5 Encounters:   11/15/18 167 lb (75.8 kg)   10/08/18 168 lb 6.4 oz (76.4 kg)   09/12/18 164 lb (74.4 kg)   06/27/18 165 lb 3.2 oz (74.9 kg)   03/29/18 162 lb 9.6 oz (73.8 kg)     Patient not examined today.    Carbamazepine level = 12. Sodium is normal. MRI of brain is normal. No significant atrophy. Hippocampi are of normal bulk and signal. Vitamin D level is 22.    DISCUSSION  Discussed results of MRI. The examination is normal. We discussed that people with ASD and epilepsy may occasionally have normal MRI because MRI does not give insight in cerebral microstructure. On the other hand presence of normal MRI is reassuring that there is no other problem. We discussed various functional capabilities as described above.    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 Excellent control with carbamazepine.  2) Autistic spectrum disorder with behavioral disorder. Some functional limitations as documented above. Followed by psychiatry.    PLAN:  1) Continue current doses of carbamazepine.  2) Referred back to psychiatry for treatment of behavioral disorder and  evaluation. We will not be able to perform this from this clinic but will be happy to fill out any paperwork regarding his cognitive status, epilepsy status, etc.  3) RTC as already scheduled.  4) Suggested Vitamin D 1000 international unit(s) twice daily. He needs to get this OTC because medical assistance is not likely to pay.    Total time 28 minutes, more than half counselling and coordinating  jessica Dyson

## 2018-11-15 NOTE — LETTER
11/15/2018     RE: René Kumar  : 1995   MRN: [id number]      Dear Colleague,    Thank you for referring your patient, René Kumar, to the [facility] at [facility]. Please see a copy of my visit note below.    [facility]      Patient:  René Kumar  :  1995   Age:  22 year old   Today's Office Visit:  11/15/2018    Epilepsy Data:    Patient History  Primary Epileptologist/Provider: Clay Dyson M.D.  Patient Status: Controlled  Epilepsy Syndrome: Localization-related epilepsy unspecified  Epilepsy Syndrome Status: Undetermined - Evaluation in Progress  Age of Onset: Age 10  Etiology  : Unknown  Other Relevant Dx/ Issues: suspected ASD     Tests/Surgery History  Last EE2018    Seizure Record  Current Visit Date: 11/15/18  Previous Visit Date: 18  Months since last visit: 2.1    Seizure Type 1: Tonic-clonic seizures  Description of Sz Type 1: family describes fall, stiffening and shaking. he is unresponsive with tongue bite and urinary incontinence.  # of Type 1 Seizure since last visit: 0  Freq. Type 1 / Month: 0    History of Present Illness:     No seizures since last seen.  No seizures since carbamazepine started.  Family presents today to learn about MRI results and to ask regarding home help.      Current Outpatient Prescriptions   Medication Sig Dispense Refill     carBAMazepine (TEGRETOL XR) 200 MG 12 hr tablet Take 2 tablets (400 mg) by mouth 2 times daily 120 tablet 11     ibuprofen (ADVIL/MOTRIN) 200 MG tablet Take 3 tablets (600 mg) by mouth every 6 hours as needed for mild pain or fever 60 tablet 0     QUEtiapine (SEROQUEL) 50 MG tablet Take 1 tablet (50 mg) by mouth nightly as needed 30 tablet 1        Medication Notes:  No side effects.      AED Medication Compliance:  compliant all of the time  Using a pill box:  Medications are administered to patient.    Review of  Systems  Have you experienced a traumatic fall since your last visit: NO  Are these falls related to your seizures: Not Applicable    Other Issues:    Brother wonders whether he can any help at home.  Cannot take medication reliably on his own. Afraid to be at home on his own, needs mother nearby.  Can dress himself but not choose clothes well. Can feed himself, but cannot organize own meals.  Speaks in Nigerien and makes basic needs known. Toilets well, has not had accidents.    Is patient safe to drive:  No    Exam:    BP (!) 148/92 (BP Location: Right arm, Patient Position: Chair, Cuff Size: Adult Regular)  Pulse 71  Temp 97.7  F (36.5  C)  Wt 167 lb (75.8 kg)  BMI 28.67 kg/m2     Wt Readings from Last 5 Encounters:   11/15/18 167 lb (75.8 kg)   10/08/18 168 lb 6.4 oz (76.4 kg)   09/12/18 164 lb (74.4 kg)   06/27/18 165 lb 3.2 oz (74.9 kg)   03/29/18 162 lb 9.6 oz (73.8 kg)     Patient not examined today.    Carbamazepine level = 12. Sodium is normal. MRI of brain is normal. No significant atrophy. Hippocampi are of normal bulk and signal. Vitamin D level is 22.    DISCUSSION  Discussed results of MRI. The examination is normal. We discussed that people with ASD and epilepsy may occasionally have normal MRI because MRI does not give insight in cerebral microstructure. On the other hand presence of normal MRI is reassuring that there is no other problem. We discussed various functional capabilities as described above.    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 Excellent control with carbamazepine.  2) Autistic spectrum disorder with behavioral disorder. Some functional limitations as documented above. Followed by psychiatry.    PLAN:  1) Continue current doses of carbamazepine.  2) Referred back to psychiatry for treatment of behavioral disorder and  evaluation. We will not be able to perform this from this clinic but will be happy to fill out any paperwork regarding his  cognitive status, epilepsy status, etc.  3) RTC as already scheduled.  4) Suggested Vitamin D 1000 international unit(s) twice daily. He needs to get this OTC because medical assistance is not likely to pay.    Total time 28 minutes, more than half counselling and coordinating cares.    Clay yDson

## 2018-11-16 NOTE — PROGRESS NOTES
Clinic Care Coordination Contact    Situation: Patient chart reviewed by care coordinator.    Background: [facility] outreached X2 via phone and X1 via letter. [facility] GUARDADO has not received a reply.    Assessment: Patient is not a candidate for care coordination at this time.    Plan/Recommendations: No further outreaches will be made at this time unless a new referral is made or a change in the Pt's status occurs. Patient was provided with this writer's contact information and encouraged to call with any questions or concerns.    [facility]  Clinic Care Coordinator  Ph. [phone]  [email or web address]

## 2019-03-19 ENCOUNTER — OFFICE VISIT (OUTPATIENT)
Dept: NEUROLOGY | Facility: CLINIC | Age: 24
End: 2019-03-19
Payer: COMMERCIAL

## 2019-03-19 ENCOUNTER — TELEPHONE (OUTPATIENT)
Dept: PSYCHIATRY | Facility: CLINIC | Age: 24
End: 2019-03-19

## 2019-03-19 VITALS
BODY MASS INDEX: 28.32 KG/M2 | SYSTOLIC BLOOD PRESSURE: 140 MMHG | TEMPERATURE: 97.5 F | HEART RATE: 70 BPM | WEIGHT: 165 LBS | DIASTOLIC BLOOD PRESSURE: 95 MMHG

## 2019-03-19 DIAGNOSIS — G40.909 SEIZURE DISORDER (H): ICD-10-CM

## 2019-03-19 RX ORDER — CARBAMAZEPINE 200 MG/1
400 TABLET, EXTENDED RELEASE ORAL 2 TIMES DAILY
Qty: 124 TABLET | Refills: 11 | Status: SHIPPED | OUTPATIENT
Start: 2019-03-19 | End: 2020-03-12

## 2019-03-19 NOTE — PROGRESS NOTES
[facility] Epilepsy Care Progress Note      Patient:  René Kumar  :  1995   Age:  23 year old   Today's Office Visit:  3/19/2019    Epilepsy Data:    Patient History  Primary Epileptologist/Provider: Clay Dyson M.D.  Patient Status: Controlled  Epilepsy Syndrome: Localization-related epilepsy unspecified  Epilepsy Syndrome Status: Undetermined - Evaluation in Progress  Age of Onset: Age 10  Etiology  : Unknown  Other Relevant Dx/ Issues: suspected ASD     Tests/Surgery History  Last EE2018    Seizure Record  Current Visit Date: 19  Previous Visit Date: 11/15/18  Months since last visit: 4.07    Seizure Type 1: Tonic-clonic seizures  Description of Sz Type 1: family describes fall, stiffening and shaking. he is unresponsive with tongue bite and urinary incontinence.  # of Type 1 Seizure since last visit: 0  Freq. Type 1 / Month: 0    History of Present Illness:     No seizures. Seizures stopped after carbamazepine started.      Current Outpatient Medications   Medication Sig Dispense Refill     carBAMazepine (TEGRETOL XR) 200 MG 12 hr tablet Take 2 tablets (400 mg) by mouth 2 times daily 120 tablet 11     ibuprofen (ADVIL/MOTRIN) 200 MG tablet Take 3 tablets (600 mg) by mouth every 6 hours as needed for mild pain or fever 60 tablet 0     QUEtiapine (SEROQUEL) 50 MG tablet Take 1 tablet (50 mg) by mouth nightly as needed 30 tablet 1        Medication Notes:  No side effects      AED Medication Compliance:  compliant all of the time  Using a pill box:  Yes    Review of Systems:  Denies headaches. Denies loss of vision. Denies double vision. Denies dysphagia. Denies cough, wheezing, hemoptysis. Denies chest pain, leg swelling. Denies significant weight change, abdominal pain, nausea, vomiting, change in bowel habits, blood per rectum. Denies dysuria, hematuria, flank pain, or kidney stones.  Have you experienced a traumatic fall since your last visit: NO  Are these falls related to  your seizures: Not Applicable    Other Issues:    No other health troubles.  Family has not started vitamin D as we suggested. Vitamin D levels had been low.  Is patient safe to drive:  No; can drive from perspective of seizures but not from perspective of cognitive ability and emotional stability.  Family denies depression. Does report occasional irritability and oppositionality. Had been following with psychiatry but stopped attending.    Exam:    BP (!) 140/95 (BP Location: Left arm, Patient Position: Chair, Cuff Size: Adult Regular)   Pulse 70   Temp 97.5  F (36.4  C)   Wt 165 lb (74.8 kg)   BMI 28.32 kg/m       Wt Readings from Last 5 Encounters:   03/19/19 165 lb (74.8 kg)   11/15/18 167 lb (75.8 kg)   10/08/18 168 lb 6.4 oz (76.4 kg)   09/12/18 164 lb (74.4 kg)   06/27/18 165 lb 3.2 oz (74.9 kg)     Follows simple commands. Cooperates with exam. Mental status appears unchanged compared to Sep 2018 visit. Normal gait including tandem. Visual fields full. Extraocular movements intact without nystagmus. Smile symmetrical. Facial sensation equal. Tongue midline and strong. No drift, pronation, or tremor. Reflexes symmetrical. Finger finger nose is done well.     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 Excellent control with carbamazepine.  2) Autistic spectrum disorder with behavioral disorder. Some functional limitations as documented above. Had been followed by psychiatry but this has lapsed.  3) Vitamin D deficiency.     PLAN:  1) Continue current doses of carbamazepine.  2) Referred back to psychiatry for treatment of behavioral disorder. We will not be able to perform this from this clinic but will be happy to fill out any paperwork regarding his cognitive status, epilepsy status, etc.  3) Again urged family to invest in Vitamin D 1000 international unit(s) twice daily. He needs to get this OTC because medical assistance is not likely to pay. Discussed that this may help with mood,  bone health and general health as well. Wrote out specific instructions on AVS.  4) RTC 9 months.     Clay Dyson

## 2019-03-19 NOTE — TELEPHONE ENCOUNTER
PSYCHIATRY CLINIC PHONE INTAKE     SERVICES REQUESTED / INTERESTED IN          Med Management    Presenting Problem and Brief History                              What would you like to be seen for? (brief description):  Pt was seeing a doctor. He was being recommended for MH services by his primary. He has an anger issue and will get angry at himself. His family tries to calm him down. He gets angry at certain times of the day, including in the evening. He doesn't get physically aggressive. Brother states it happens for no reason. Sleep is good, at least 8 hours.         DISPOSITION      3/19/19 Explained to pt's brother that the pt would be more appropriate at another facility.  Referred [facility] to [facility].  Abbie Ryan,

## 2019-03-19 NOTE — PATIENT INSTRUCTIONS
Your seizures are well controlled. Continue taking carbamazepine the way you are now.    Your vitamin D level is low. Go to pharmacy and get Vitamin D 1000 international unit(s)   Take two pills per day, that is a total of 2000 international unit(s).   Ask pharmacist for help if needed.

## 2019-03-19 NOTE — LETTER
3/19/2019       RE: René Kumar  : 1995   MRN: [id number]      Dear Colleague,    Thank you for referring your patient, René Kumar, to the [facility] EPILEPSY CARE at [facility]. Please see a copy of my visit note below.    [facility]      Patient:  René Kumar  :  1995   Age:  23 year old   Today's Office Visit:  3/19/2019    Epilepsy Data:    Patient History  Primary Epileptologist/Provider: Clay Dyson M.D.  Patient Status: Controlled  Epilepsy Syndrome: Localization-related epilepsy unspecified  Epilepsy Syndrome Status: Undetermined - Evaluation in Progress  Age of Onset: Age 10  Etiology  : Unknown  Other Relevant Dx/ Issues: suspected ASD     Tests/Surgery History  Last EE2018    Seizure Record  Current Visit Date: 19  Previous Visit Date: 11/15/18  Months since last visit: 4.07    Seizure Type 1: Tonic-clonic seizures  Description of Sz Type 1: family describes fall, stiffening and shaking. he is unresponsive with tongue bite and urinary incontinence.  # of Type 1 Seizure since last visit: 0  Freq. Type 1 / Month: 0    History of Present Illness:     No seizures. Seizures stopped after carbamazepine started.      Current Outpatient Medications   Medication Sig Dispense Refill     carBAMazepine (TEGRETOL XR) 200 MG 12 hr tablet Take 2 tablets (400 mg) by mouth 2 times daily 120 tablet 11     ibuprofen (ADVIL/MOTRIN) 200 MG tablet Take 3 tablets (600 mg) by mouth every 6 hours as needed for mild pain or fever 60 tablet 0     QUEtiapine (SEROQUEL) 50 MG tablet Take 1 tablet (50 mg) by mouth nightly as needed 30 tablet 1        Medication Notes:  No side effects      AED Medication Compliance:  compliant all of the time  Using a pill box:  Yes    Review of Systems:  Denies headaches. Denies loss of vision. Denies double vision. Denies dysphagia. Denies cough, wheezing, hemoptysis. Denies chest  pain, leg swelling. Denies significant weight change, abdominal pain, nausea, vomiting, change in bowel habits, blood per rectum. Denies dysuria, hematuria, flank pain, or kidney stones.  Have you experienced a traumatic fall since your last visit: NO  Are these falls related to your seizures: Not Applicable    Other Issues:    No other health troubles.  Family has not started vitamin D as we suggested. Vitamin D levels had been low.  Is patient safe to drive:  No; can drive from perspective of seizures but not from perspective of cognitive ability and emotional stability.  Family denies depression. Does report occasional irritability and oppositionality. Had been following with psychiatry but stopped attending.    Exam:    BP (!) 140/95 (BP Location: Left arm, Patient Position: Chair, Cuff Size: Adult Regular)   Pulse 70   Temp 97.5  F (36.4  C)   Wt 165 lb (74.8 kg)   BMI 28.32 kg/m        Wt Readings from Last 5 Encounters:   03/19/19 165 lb (74.8 kg)   11/15/18 167 lb (75.8 kg)   10/08/18 168 lb 6.4 oz (76.4 kg)   09/12/18 164 lb (74.4 kg)   06/27/18 165 lb 3.2 oz (74.9 kg)     Follows simple commands. Cooperates with exam. Mental status appears unchanged compared to Sep 2018 visit. Normal gait including tandem. Visual fields full. Extraocular movements intact without nystagmus. Smile symmetrical. Facial sensation equal. Tongue midline and strong. No drift, pronation, or tremor. Reflexes symmetrical. Finger finger nose is done well.     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 Excellent control with carbamazepine.  2) Autistic spectrum disorder with behavioral disorder. Some functional limitations as documented above. Had been followed by psychiatry but this has lapsed.  3) Vitamin D deficiency.     PLAN:  1) Continue current doses of carbamazepine.  2) Referred back to psychiatry for treatment of behavioral disorder. We will not be able to perform this from this clinic but will be  happy to fill out any paperwork regarding his cognitive status, epilepsy status, etc.  3) Again urged family to invest in Vitamin D 1000 international unit(s) twice daily. He needs to get this OTC because medical assistance is not likely to pay. Discussed that this may help with mood, bone health and general health as well. Wrote out specific instructions on AVS.  4) RTC 9 months.     Again, thank you for allowing me to participate in the care of your patient.      Sincerely,    Clay Dyson MD

## 2019-05-20 ENCOUNTER — OFFICE VISIT (OUTPATIENT)
Dept: INTERNAL MEDICINE | Facility: CLINIC | Age: 24
End: 2019-05-20
Payer: COMMERCIAL

## 2019-05-20 VITALS
HEIGHT: 64 IN | TEMPERATURE: 98.3 F | HEART RATE: 72 BPM | WEIGHT: 158.3 LBS | RESPIRATION RATE: 18 BRPM | DIASTOLIC BLOOD PRESSURE: 70 MMHG | SYSTOLIC BLOOD PRESSURE: 104 MMHG | OXYGEN SATURATION: 95 % | BODY MASS INDEX: 27.02 KG/M2

## 2019-05-20 DIAGNOSIS — F84.0 AUTISM: ICD-10-CM

## 2019-05-20 PROCEDURE — 99213 OFFICE O/P EST LOW 20 MIN: CPT | Performed by: NURSE PRACTITIONER

## 2019-05-20 RX ORDER — QUETIAPINE FUMARATE 50 MG/1
50 TABLET, FILM COATED ORAL
Qty: 30 TABLET | Refills: 1 | Status: SHIPPED | OUTPATIENT
Start: 2019-05-20 | End: 2019-08-02

## 2019-05-20 ASSESSMENT — MIFFLIN-ST. JEOR: SCORE: 1634.04

## 2019-05-20 NOTE — NURSING NOTE
"/70 (BP Location: Right arm, Patient Position: Sitting, Cuff Size: Adult Regular)   Pulse 72   Temp 98.3  F (36.8  C) (Oral)   Resp 18   Ht 1.626 m (5' 4\")   Wt 71.8 kg (158 lb 4.8 oz)   SpO2 95%   BMI 27.17 kg/m    Patient here for medication refill and would like form filled out.  "

## 2019-05-20 NOTE — PROGRESS NOTES
"  SUBJECTIVE:   René Kumar is a 23 year old male who presents to clinic today for the following   health issues:  Patient here for medication refill     Additional history: as documented    Reviewed  and updated as needed this visit by clinical staff  Tobacco  Allergies  Meds  Med Hx  Surg Hx  Fam Hx  Soc Hx        Reviewed and updated as needed this visit by Provider         Patient Active Problem List   Diagnosis     Seizure (H)     Cognitive disorder     Partial symptomatic epilepsy with complex partial seizures, intractable, with status epilepticus (H)     Past Surgical History:   Procedure Laterality Date     NO HISTORY OF SURGERY         Social History     Tobacco Use     Smoking status: Never Smoker     Smokeless tobacco: Never Used   Substance Use Topics     Alcohol use: No     History reviewed. No pertinent family history.      Current Outpatient Medications   Medication Sig Dispense Refill     carBAMazepine (TEGRETOL XR) 200 MG 12 hr tablet Take 2 tablets (400 mg) by mouth 2 times daily 124 tablet 11     ibuprofen (ADVIL/MOTRIN) 200 MG tablet Take 3 tablets (600 mg) by mouth every 6 hours as needed for mild pain or fever 60 tablet 0     QUEtiapine (SEROQUEL) 50 MG tablet Take 1 tablet (50 mg) by mouth nightly as needed 30 tablet 1     BP Readings from Last 3 Encounters:   05/20/19 104/70   03/19/19 (!) 140/95   11/15/18 (!) 148/92    Wt Readings from Last 3 Encounters:   05/20/19 71.8 kg (158 lb 4.8 oz)   03/19/19 74.8 kg (165 lb)   11/15/18 75.8 kg (167 lb)                    Assessment & Plan       ICD-10-CM    1. Autism F84.0 QUEtiapine (SEROQUEL) 50 MG tablet        BMI:   Estimated body mass index is 27.17 kg/m  as calculated from the following:    Height as of this encounter: 1.626 m (5' 4\").    Weight as of this encounter: 71.8 kg (158 lb 4.8 oz).           F/u with psychiatry q 6 months as planned.    The current medical regimen is effective;  continue present plan and " medications.      Teagan Mckeon NP  [facility]

## 2019-08-02 DIAGNOSIS — F84.0 AUTISM: ICD-10-CM

## 2019-08-05 NOTE — TELEPHONE ENCOUNTER
"Requested Prescriptions   Pending Prescriptions Disp Refills     QUEtiapine (SEROQUEL) 50 MG tablet [Pharmacy Med Name: QUETIAPINE 50MG TABLETS] 30 tablet 0     Sig: TAKE 1 TABLET(50 MG) BY MOUTH EVERY NIGHT AS NEEDED   Last Written Prescription Date:  05/20/2019  Last Fill Quantity: 30,  # refills: 01   Last office visit: 5/20/2019 with prescribing provider:     Future Office Visit:      Antipsychotic Medications Failed - 8/2/2019  5:54 PM        Failed - Lipid panel on file within the past 12 months     No lab results found.            Failed - CBC on file in past 12 months     No lab results found.              Passed - Blood pressure under 140/90 in past 12 months     BP Readings from Last 3 Encounters:   05/20/19 104/70   03/19/19 (!) 140/95   11/15/18 (!) 148/92                 Passed - Patient is 12 years of age or older        Passed - Heart Rate on file within past 12 months     Pulse Readings from Last 3 Encounters:   05/20/19 72   03/19/19 70   11/15/18 71               Passed - A1c or Glucose on file in past 12 months     Recent Labs   Lab Test 09/19/18  1459   *       Please review patients last 3 weights. If a weight gain of >10 lbs exists, you may refill the prescription once after instructing the patient to schedule an appointment within the next 30 days.    Wt Readings from Last 3 Encounters:   05/20/19 71.8 kg (158 lb 4.8 oz)   03/19/19 74.8 kg (165 lb)   11/15/18 75.8 kg (167 lb)             Passed - Medication is active on med list        Passed - Recent (6 mo) or future (30 days) visit within the authorizing provider's specialty     Patient had office visit in the last 6 months or has a visit in the next 30 days with authorizing provider or within the authorizing provider's specialty.  See \"Patient Info\" tab in inbasket, or \"Choose Columns\" in Meds & Orders section of the refill encounter.            "

## 2019-08-06 RX ORDER — QUETIAPINE FUMARATE 50 MG/1
TABLET, FILM COATED ORAL
Qty: 30 TABLET | Refills: 0 | Status: SHIPPED | OUTPATIENT
Start: 2019-08-06 | End: 2020-12-09

## 2019-08-06 NOTE — TELEPHONE ENCOUNTER
Routing refill request to provider for review/approval because:  Labs not current:  Does not have lipid panel or CBC on file.

## 2019-08-07 DIAGNOSIS — F84.0 AUTISM: ICD-10-CM

## 2019-08-07 RX ORDER — QUETIAPINE FUMARATE 50 MG/1
TABLET, FILM COATED ORAL
Qty: 30 TABLET | Refills: 0 | OUTPATIENT
Start: 2019-08-07

## 2019-08-07 NOTE — TELEPHONE ENCOUNTER
Medication refused, prescription filled yesterday at [facility] pharmacy per primary care provider. .

## 2019-10-02 DIAGNOSIS — F84.0 AUTISM: ICD-10-CM

## 2019-10-03 NOTE — TELEPHONE ENCOUNTER
"Requested Prescriptions   Pending Prescriptions Disp Refills     QUEtiapine (SEROQUEL) 50 MG tablet [Pharmacy Med Name: QUETIAPINE 50MG TABLETS] 30 tablet 0     Sig: TAKE 1 TABLET(50 MG) BY MOUTH EVERY NIGHT AS NEEDED   Last Written Prescription Date:  08/06/2019  Last Fill Quantity: 30,  # refills: 0   Last office visit: 5/20/2019 with prescribing provider:     Future Office Visit:      Antipsychotic Medications Failed - 10/2/2019  6:08 PM        Failed - Lipid panel on file within the past 12 months     No lab results found.            Failed - CBC on file in past 12 months     No lab results found.              Failed - A1c or Glucose on file in past 12 months     Recent Labs   Lab Test 09/19/18  1459   *       Please review patients last 3 weights. If a weight gain of >10 lbs exists, you may refill the prescription once after instructing the patient to schedule an appointment within the next 30 days.    Wt Readings from Last 3 Encounters:   05/20/19 71.8 kg (158 lb 4.8 oz)   03/19/19 74.8 kg (165 lb)   11/15/18 75.8 kg (167 lb)             Passed - Blood pressure under 140/90 in past 12 months     BP Readings from Last 3 Encounters:   05/20/19 104/70   03/19/19 (!) 140/95   11/15/18 (!) 148/92                 Passed - Patient is 12 years of age or older        Passed - Heart Rate on file within past 12 months     Pulse Readings from Last 3 Encounters:   05/20/19 72   03/19/19 70   11/15/18 71               Passed - Medication is active on med list        Passed - Recent (6 mo) or future (30 days) visit within the authorizing provider's specialty     Patient had office visit in the last 6 months or has a visit in the next 30 days with authorizing provider or within the authorizing provider's specialty.  See \"Patient Info\" tab in inbasket, or \"Choose Columns\" in Meds & Orders section of the refill encounter.            "

## 2019-10-04 RX ORDER — QUETIAPINE FUMARATE 50 MG/1
TABLET, FILM COATED ORAL
Qty: 30 TABLET | Refills: 0 | Status: SHIPPED | OUTPATIENT
Start: 2019-10-04 | End: 2019-12-02

## 2019-10-04 NOTE — TELEPHONE ENCOUNTER
Routing refill request to provider for review/approval because:  Labs not current:  Does not have current Lipid panel, CBC or glucose on file.

## 2019-12-02 DIAGNOSIS — F84.0 AUTISM: ICD-10-CM

## 2019-12-02 NOTE — TELEPHONE ENCOUNTER
"Requested Prescriptions   Pending Prescriptions Disp Refills     QUEtiapine (SEROQUEL) 50 MG tablet [Pharmacy Med Name: QUETIAPINE 50MG  Last Written Prescription Date:  10/4/2019  Last Fill Quantity: 30,  # refills: 0   Last office visit: 5/20/2019 with prescribing provider:     Future Office Visit:   TABLETS] 30 tablet 0     Sig: TAKE 1 TABLET(50 MG) BY MOUTH EVERY NIGHT AS NEEDED       Antipsychotic Medications Failed - 12/2/2019  3:00 PM        Failed - Lipid panel on file within the past 12 months     No lab results found.            Failed - CBC on file in past 12 months     No lab results found.              Failed - A1c or Glucose on file in past 12 months     Recent Labs   Lab Test 09/19/18  1459   *       Please review patients last 3 weights. If a weight gain of >10 lbs exists, you may refill the prescription once after instructing the patient to schedule an appointment within the next 30 days.    Wt Readings from Last 3 Encounters:   05/20/19 71.8 kg (158 lb 4.8 oz)   03/19/19 74.8 kg (165 lb)   11/15/18 75.8 kg (167 lb)             Failed - Recent (6 mo) or future (30 days) visit within the authorizing provider's specialty     Patient had office visit in the last 6 months or has a visit in the next 30 days with authorizing provider or within the authorizing provider's specialty.  See \"Patient Info\" tab in inbasket, or \"Choose Columns\" in Meds & Orders section of the refill encounter.            Passed - Blood pressure under 140/90 in past 12 months     BP Readings from Last 3 Encounters:   05/20/19 104/70   03/19/19 (!) 140/95   11/15/18 (!) 148/92                 Passed - Patient is 12 years of age or older        Passed - Heart Rate on file within past 12 months     Pulse Readings from Last 3 Encounters:   05/20/19 72   03/19/19 70   11/15/18 71               Passed - Medication is active on med list        "

## 2019-12-04 RX ORDER — QUETIAPINE FUMARATE 50 MG/1
TABLET, FILM COATED ORAL
Qty: 30 TABLET | Refills: 0 | Status: SHIPPED | OUTPATIENT
Start: 2019-12-04 | End: 2020-02-04

## 2019-12-04 NOTE — TELEPHONE ENCOUNTER
Routing refill request to provider for review/approval because:  Labs not current:  No lipid panel, A1C or CBC on file.

## 2020-02-04 DIAGNOSIS — F84.0 AUTISM: ICD-10-CM

## 2020-02-04 NOTE — TELEPHONE ENCOUNTER
Last Written Prescription Date:  12/04/2019  Last Fill Quantity: 30,  # refills: 0   Last office visit: 5/20/2019 with prescribing provider:  Teagan Mckeon   Future Office Visit:

## 2020-02-13 RX ORDER — QUETIAPINE FUMARATE 50 MG/1
TABLET, FILM COATED ORAL
Qty: 30 TABLET | Refills: 3 | Status: SHIPPED | OUTPATIENT
Start: 2020-02-13 | End: 2020-08-11

## 2020-02-13 NOTE — TELEPHONE ENCOUNTER
"This was not routed originally.     Last OV on 5/20/19.     No current labs.     No results for input(s): CHOL, HDL, LDL, TRIG, CHOLHDLRATIO in the last 31230 hours.    CBC RESULTS: No results for input(s): WBC, RBC, HGB, HCT, MCV, MCH, MCHC, RDW, PLT in the last 24824 hours.    No results found for: A1C    Requested Prescriptions   Pending Prescriptions Disp Refills     QUEtiapine (SEROQUEL) 50 MG tablet 30 tablet 0     Sig: TAKE 1 TABLET(50 MG) BY MOUTH EVERY NIGHT AS NEEDED       Antipsychotic Medications Failed - 2/13/2020 10:47 AM        Failed - Lipid panel on file within the past 12 months     No lab results found.            Failed - CBC on file in past 12 months     No lab results found.              Failed - A1c or Glucose on file in past 12 months     Recent Labs   Lab Test 09/19/18  1459   *       Please review patients last 3 weights. If a weight gain of >10 lbs exists, you may refill the prescription once after instructing the patient to schedule an appointment within the next 30 days.    Wt Readings from Last 3 Encounters:   05/20/19 71.8 kg (158 lb 4.8 oz)   03/19/19 74.8 kg (165 lb)   11/15/18 75.8 kg (167 lb)             Failed - Recent (6 mo) or future (30 days) visit within the authorizing provider's specialty     Patient had office visit in the last 6 months or has a visit in the next 30 days with authorizing provider or within the authorizing provider's specialty.  See \"Patient Info\" tab in inbasket, or \"Choose Columns\" in Meds & Orders section of the refill encounter.            Passed - Blood pressure under 140/90 in past 12 months     BP Readings from Last 3 Encounters:   05/20/19 104/70   03/19/19 (!) 140/95   11/15/18 (!) 148/92                 Passed - Patient is 12 years of age or older        Passed - Heart Rate on file within past 12 months     Pulse Readings from Last 3 Encounters:   05/20/19 72   03/19/19 70   11/15/18 71               Passed - Medication is active on med list "

## 2020-03-12 ENCOUNTER — OFFICE VISIT (OUTPATIENT)
Dept: NEUROLOGY | Facility: CLINIC | Age: 25
End: 2020-03-12
Payer: COMMERCIAL

## 2020-03-12 VITALS
BODY MASS INDEX: 24.24 KG/M2 | DIASTOLIC BLOOD PRESSURE: 53 MMHG | HEART RATE: 78 BPM | WEIGHT: 141.2 LBS | SYSTOLIC BLOOD PRESSURE: 117 MMHG

## 2020-03-12 DIAGNOSIS — G40.909 SEIZURE DISORDER (H): ICD-10-CM

## 2020-03-12 DIAGNOSIS — G40.211 PARTIAL SYMPTOMATIC EPILEPSY WITH COMPLEX PARTIAL SEIZURES, INTRACTABLE, WITH STATUS EPILEPTICUS (H): ICD-10-CM

## 2020-03-12 RX ORDER — CARBAMAZEPINE 200 MG/1
400 TABLET, EXTENDED RELEASE ORAL 2 TIMES DAILY
Qty: 124 TABLET | Refills: 11 | Status: SHIPPED | OUTPATIENT
Start: 2020-03-12 | End: 2021-04-19

## 2020-03-12 ASSESSMENT — PAIN SCALES - GENERAL: PAINLEVEL: NO PAIN (0)

## 2020-03-12 NOTE — LETTER
3/12/2020       RE: René Kumar  : 1995   MRN: [id number]      Dear Colleague,    Thank you for referring your patient, René Kumar, to the [facility] EPILEPSY CARE at [facility]. Please see a copy of my visit note below.    [facility]      Patient:  René Kumar  :  1995   Age:  24 year old   Today's Office Visit:  3/12/2020    Epilepsy Data:    Patient History  Primary Epileptologist/Provider: Clay Dyson M.D.  Patient Status: Controlled  Epilepsy Syndrome: Localization-related epilepsy unspecified  Epilepsy Syndrome Status: Undetermined - Evaluation in Progress  Age of Onset: Age 10  Etiology  : Unknown  Other Relevant Dx/ Issues: suspected ASD     Tests/Surgery History  Last EE2018    Seizure Record  Current Visit Date: 20  Previous Visit Date: 19  Months since last visit: 11.79  Seizure Type 1: Tonic-clonic seizures  Description of Sz Type 1: family describes fall, stiffening and shaking. he is unresponsive with tongue bite and urinary incontinence.  # of Type 1 Seizure since last visit: 0  Freq. Type 1 / Month: 0    History of Present Illness:     No seizures. Last seizure was before carbamazepine was started.      Current Outpatient Medications   Medication Sig Dispense Refill     carBAMazepine (TEGRETOL XR) 200 MG 12 hr tablet Take 2 tablets (400 mg) by mouth 2 times daily 124 tablet 11     ibuprofen (ADVIL/MOTRIN) 200 MG tablet Take 3 tablets (600 mg) by mouth every 6 hours as needed for mild pain or fever 60 tablet 0     QUEtiapine (SEROQUEL) 50 MG tablet TAKE 1 TABLET(50 MG) BY MOUTH EVERY NIGHT AS NEEDED 30 tablet 3     QUEtiapine (SEROQUEL) 50 MG tablet TAKE 1 TABLET(50 MG) BY MOUTH EVERY NIGHT AS NEEDED 30 tablet 0        Medication Notes:       AED Medication Compliance:  compliant all of the time  Using a pill box:  administered by family    Review of Systems:  Lethargy / Tiredness:   No  Nausea / Vomiting:  No  Double Vision:  No  Sleepiness:  No  Depression:  No  Slowed Cognitive Function:  No  Memory Problems:  No  Poor Balance:  No  Dizziness:  No  Appetite Changes:  No  Blurred Vision:  No  Decreased Libido:  No  Sleep Changes:  No  Behavioral Changes:  No  Skin: negative  Respiratory: No shortness of breath, dyspnea on exertion, cough, or hemoptysis  Cardiovascular: negative  Have you experienced a traumatic fall since your last visit: NO  Are these falls related to your seizures: Not Applicable    Other Issues:  Family reports that he continues to be irritable, particularly around his mother. He will frequently get on a run of talking angrily and arguing with family when he hears something disagreeable to him. His irritability is non-violent, without any physical attacks. This has been happening even before he arrived to the [place]. He was constantly talking during the interview and his thought process was difficult to redirect. Brother was interviewed separately and said that he does not have depressed mood. No problems at school; takes ESL classes. No problems with sleep. Will take quetiapine PRN to help with sleep. He currently does not see a psychiatrist. Referrals have been made in the past, but family have been hesitant due to being non-English speaking. They would like him to see one, and are requesting a printed out copy of a referral.     Is patient safe to drive:  No    Exam:    /53 (BP Location: Left arm, Patient Position: Sitting, Cuff Size: Adult Regular)   Pulse 78   Wt 141 lb 3.2 oz (64 kg)   BMI 24.24 kg/m       Wt Readings from Last 5 Encounters:   03/12/20 141 lb 3.2 oz (64 kg)   05/20/19 158 lb 4.8 oz (71.8 kg)   03/19/19 165 lb (74.8 kg)   11/15/18 167 lb (75.8 kg)   10/08/18 168 lb 6.4 oz (76.4 kg)       General Appearance: Normal, young  male, [place]an-speaking  Mental Status: Alert, oriented, answers and understands questions appropriately. Will often  talk without pauses, making him very difficult to interrupt. Needs to be told multiple times to allow others to speak.    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 On carbamazepine. Has not had a seizure since being on this medication in 2018. Last carbamazepine level was mildly elevated at 12.3 on 9/19/18. Last sodium level was normal.  2) Autistic spectrum disorder with behavioral disorder. Unclear whether he is suffering from depression. Appears to be irritable mainly towards his mother and other family members, which could be a sign of depression. Does not follow with psychiatry.  3) Vitamin D deficiency. Level was 22 in 9/19/2018.     - Continue carbamazepine  mg BID  - Carbamazepine level  - Na level  - On-site Psychiatry referral    - Recommend Vit D supplementation. Family was urged and reminded to obtain OTC supplements.  - Return to clinic in 6 months    The patient was seen and discussed with Dr. Karis Moreira, MS4    I personnally took history and examined patient. Agree with history, impression and plan as above. Time personally spent interviewing, examining, and interacting with patient was 18 minutes.    Clay Dyson MD

## 2020-03-12 NOTE — PATIENT INSTRUCTIONS
Patient with epilepsy well controlled with carbamazepine.  Probable mild to moderate developmental delay. May have additional ASD.    Significant irritability directed at family tho no violence. Reportedly no irritability at work.  Please evaluate for depression and whether trial of serotonin specific reuptake inhibitor may be reasonable.      Please get Vitamin D 2000 International Units at the pharmacy.  Take one of these every day.

## 2020-03-12 NOTE — PROGRESS NOTES
[facility] Epilepsy Care Progress Note      Patient:  René Kumar  :  1995   Age:  24 year old   Today's Office Visit:  3/12/2020    Epilepsy Data:    Patient History  Primary Epileptologist/Provider: Clay Dyson M.D.  Patient Status: Controlled  Epilepsy Syndrome: Localization-related epilepsy unspecified  Epilepsy Syndrome Status: Undetermined - Evaluation in Progress  Age of Onset: Age 10  Etiology  : Unknown  Other Relevant Dx/ Issues: suspected ASD     Tests/Surgery History  Last EE2018    Seizure Record  Current Visit Date: 20  Previous Visit Date: 19  Months since last visit: 11.79  Seizure Type 1: Tonic-clonic seizures  Description of Sz Type 1: family describes fall, stiffening and shaking. he is unresponsive with tongue bite and urinary incontinence.  # of Type 1 Seizure since last visit: 0  Freq. Type 1 / Month: 0    History of Present Illness:     No seizures. Last seizure was before carbamazepine was started.      Current Outpatient Medications   Medication Sig Dispense Refill     carBAMazepine (TEGRETOL XR) 200 MG 12 hr tablet Take 2 tablets (400 mg) by mouth 2 times daily 124 tablet 11     ibuprofen (ADVIL/MOTRIN) 200 MG tablet Take 3 tablets (600 mg) by mouth every 6 hours as needed for mild pain or fever 60 tablet 0     QUEtiapine (SEROQUEL) 50 MG tablet TAKE 1 TABLET(50 MG) BY MOUTH EVERY NIGHT AS NEEDED 30 tablet 3     QUEtiapine (SEROQUEL) 50 MG tablet TAKE 1 TABLET(50 MG) BY MOUTH EVERY NIGHT AS NEEDED 30 tablet 0        Medication Notes:       AED Medication Compliance:  compliant all of the time  Using a pill box:  administered by family    Review of Systems:  Lethargy / Tiredness:  No  Nausea / Vomiting:  No  Double Vision:  No  Sleepiness:  No  Depression:  No  Slowed Cognitive Function:  No  Memory Problems:  No  Poor Balance:  No  Dizziness:  No  Appetite Changes:  No  Blurred Vision:  No  Decreased Libido:  No  Sleep Changes:  No  Behavioral Changes:   No  Skin: negative  Respiratory: No shortness of breath, dyspnea on exertion, cough, or hemoptysis  Cardiovascular: negative  Have you experienced a traumatic fall since your last visit: NO  Are these falls related to your seizures: Not Applicable    Other Issues:  Family reports that he continues to be irritable, particularly around his mother. He will frequently get on a run of talking angrily and arguing with family when he hears something disagreeable to him. His irritability is non-violent, without any physical attacks. This has been happening even before he arrived to the [place]. He was constantly talking during the interview and his thought process was difficult to redirect. Brother was interviewed separately and said that he does not have depressed mood. No problems at school; takes ESL classes. No problems with sleep. Will take quetiapine PRN to help with sleep. He currently does not see a psychiatrist. Referrals have been made in the past, but family have been hesitant due to being non-English speaking. They would like him to see one, and are requesting a printed out copy of a referral.     Is patient safe to drive:  No    Exam:    /53 (BP Location: Left arm, Patient Position: Sitting, Cuff Size: Adult Regular)   Pulse 78   Wt 141 lb 3.2 oz (64 kg)   BMI 24.24 kg/m       Wt Readings from Last 5 Encounters:   03/12/20 141 lb 3.2 oz (64 kg)   05/20/19 158 lb 4.8 oz (71.8 kg)   03/19/19 165 lb (74.8 kg)   11/15/18 167 lb (75.8 kg)   10/08/18 168 lb 6.4 oz (76.4 kg)       General Appearance: Normal, young  male, [place] citizen-speaking  Mental Status: Alert, oriented, answers and understands questions appropriately. Will often talk without pauses, making him very difficult to interrupt. Needs to be told multiple times to allow others to speak.    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 On carbamazepine. Has not had a seizure since being on this medication in 2018. Last  carbamazepine level was mildly elevated at 12.3 on 9/19/18. Last sodium level was normal.  2) Autistic spectrum disorder with behavioral disorder. Unclear whether he is suffering from depression. Appears to be irritable mainly towards his mother and other family members, which could be a sign of depression. Does not follow with psychiatry.  3) Vitamin D deficiency. Level was 22 in 9/19/2018.     - Continue carbamazepine  mg BID  - Carbamazepine level  - Na level  - On-site Psychiatry referral    - Recommend Vit D supplementation. Family was urged and reminded to obtain OTC supplements.  - Return to clinic in 6 months    The patient was seen and discussed with Dr. Karis Moreira, MS4    I personnally took history and examined patient. Agree with history, impression and plan as above. Time personally spent interviewing, examining, and interacting with patient was 18 minutes.    Clay Dyson MD

## 2020-08-09 DIAGNOSIS — F84.0 AUTISM: ICD-10-CM

## 2020-08-10 NOTE — TELEPHONE ENCOUNTER
Pending Prescriptions:                       Disp   Refills    QUEtiapine (SEROQUEL) 50 MG tablet [Pharma*30 tab*3        Sig: TAKE 1 TABLET BY MOUTH EVERY NIGHT AS NEEDED    Routing refill request to provider for review/approval because:  Patient fails protocol

## 2020-08-11 RX ORDER — QUETIAPINE FUMARATE 50 MG/1
TABLET, FILM COATED ORAL
Qty: 30 TABLET | Refills: 3 | Status: SHIPPED | OUTPATIENT
Start: 2020-08-11 | End: 2020-10-14

## 2020-09-02 ENCOUNTER — OFFICE VISIT (OUTPATIENT)
Dept: NEUROLOGY | Facility: CLINIC | Age: 25
End: 2020-09-02
Payer: COMMERCIAL

## 2020-09-02 VITALS
TEMPERATURE: 97.7 F | SYSTOLIC BLOOD PRESSURE: 122 MMHG | DIASTOLIC BLOOD PRESSURE: 77 MMHG | WEIGHT: 137.8 LBS | BODY MASS INDEX: 23.65 KG/M2 | HEART RATE: 73 BPM

## 2020-09-02 DIAGNOSIS — R62.50 DEVELOPMENTAL DELAY, MODERATE: Primary | ICD-10-CM

## 2020-09-02 NOTE — PATIENT INSTRUCTIONS
Your vitamin D level is low.    Take vitamin D 2000 international units every day. You can get this over the counter. Ask your pharmacist.

## 2020-09-02 NOTE — PROGRESS NOTES
[facility] Epilepsy Care Progress Note      Patient:  René Kumar  :  1995   Age:  24 year old   Today's Office Visit:  2020    Epilepsy Data:    Patient History  Primary Epileptologist/Provider: Clay Dyson M.D.  Patient Status: Controlled  Epilepsy Syndrome: Localization-related epilepsy unspecified  Epilepsy Syndrome Status: Undetermined - Evaluation in Progress  Age of Onset: Age 10  Etiology  : Unknown  Other Relevant Dx/ Issues: suspected ASD     Tests/Surgery History  Last EE2018    Seizure Record  Current Visit Date: 20  Previous Visit Date: 20  Months since last visit: 5.72  Seizure Type 1: Tonic-clonic seizures  Description of Sz Type 1: family describes fall, stiffening and shaking. he is unresponsive with tongue bite and urinary incontinence.  # of Type 1 Seizure since last visit: 0  Freq. Type 1 / Month: 0      Background History:  Swedish male seizure onset 11 yo. EEG with multifocal (Lt T, Rt T, Lt PS, Rt PS) epileptiform discharges. Severe epilepsy reportedly with daily convulsive seizures while in [place] but reportedly with complete control [place] Mild to mod DE and poss ASD. CDI MRI normal with nl hippocampal volume.    History of Present Illness:     No seizures since last visit. Last seizure occurred in 2018, when he was started on carbamazepine.  He has not had seizures since carbamazepine was initiated.    Current Outpatient Medications   Medication Sig Dispense Refill     carBAMazepine (TEGRETOL XR) 200 MG 12 hr tablet Take 2 tablets (400 mg) by mouth 2 times daily 124 tablet 11     ibuprofen (ADVIL/MOTRIN) 200 MG tablet Take 3 tablets (600 mg) by mouth every 6 hours as needed for mild pain or fever 60 tablet 0     QUEtiapine (SEROQUEL) 50 MG tablet TAKE 1 TABLET BY MOUTH EVERY NIGHT AS NEEDED 30 tablet 3     QUEtiapine (SEROQUEL) 50 MG tablet TAKE 1 TABLET(50 MG) BY MOUTH EVERY NIGHT AS NEEDED 30 tablet 0  "       Medication Notes:    No side effects.    AED Medication Compliance:  compliant all of the time  Using a pill box:  No    Review of Systems:  Denies headaches. Denies loss of vision. Denies double vision. Denies dysphagia. Denies cough, wheezing, hemoptysis. Denies chest pain, leg swelling. Denies significant weight change, abdominal pain, nausea, vomiting, change in bowel habits, blood per rectum. Denies dysuria, hematuria, flank pain, or kidney stones.  Have you experienced a traumatic fall since your last visit: NO  Are these falls related to your seizures: Not Applicable    Other Issues:    Continues with behavioral concerns. Very argumentative and angry with mother and family.  E.g. gets upset when walks past children driving trikes or bicycles and expresses belief that they are about to ride into him.  No violence. Irritable. Stomps feet, obsesses about items he is upset about. Does not cry.  Denies suicidal ideation. Appetite is good but lost 30 pounds over last two years.  Family still has not started vitamin D.  Is patient safe to drive:  No    Exam:    /77   Pulse 73   Temp 97.7  F (36.5  C)   Wt 137 lb 12.8 oz (62.5 kg)   BMI 23.65 kg/m       Wt Readings from Last 5 Encounters:   09/02/20 137 lb 12.8 oz (62.5 kg)   03/12/20 141 lb 3.2 oz (64 kg)   05/20/19 158 lb 4.8 oz (71.8 kg)   03/19/19 165 lb (74.8 kg)   11/15/18 167 lb (75.8 kg)     Speaks only Scottish and appears mildly to moderately impaired. Mental status examination difficult to obtain through ; not clear how much is translation, how much is impairment, how much not cooperation. Alert. Oriented to day. Cannot tell me what month or year is but states that \"I don't pay attention to such things\". Cannot tell me what city he is in; knows he is in a physicians office. Cannot tell me on what floor. Follows simple but not complicated verbal commands. As best as  can tell he is fluent in [place] but simple ideas. Normal " gait. Suspicious, depressed appearance. Denies auditory hallucinations but not clear that he understands the question. Visual fields full to threat. Extraocular movements intact without nystagmus. Inpaired frontal saccades. Smile symmetrical. Tongue midline and strong. No drift, pronation, or tremor. Tone is normal. Reflexes symmetrical. Finger finger nose is done well.    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 Per family under complete control with high therapeutic carbamazepine levels.  2) Developmental delay, irritability. Normal MRI. Irritability; unclear if depression, ASD, perhaps some tendency toward paranoia. Difficult to assess through language barrier. Does not appear overtly psychotic. Psychiatric visit has not occurred.  3) Vitamin D deficiency; family has not pursued treatment as previously discussed.    PLAN:  1) Continue current carbamazepine.  2) Psychiatry evaluation at [facility] if possible.  3) Asked family to obtain Vitamin D 2000 international unit(s) at pharmacy and to administer daily. Specific instructions written; asked  to translate.  4) RTC 9 months.    In person time today 22 minutes, more than half counselling and coordinating cares.    Clay Dyson MD

## 2020-09-02 NOTE — LETTER
2020       RE: René Kumar  : 1995   MRN: [id number]      Dear Colleague,    Thank you for referring your patient, René Kumar, to the [facility] EPILEPSY CARE at [facility]. Please see a copy of my visit note below.    [facility] [place]      Patient:  René Kumar  :  1995   Age:  24 year old   Today's Office Visit:  2020    Epilepsy Data:    Patient History  Primary Epileptologist/Provider: Clay Dyson M.D.  Patient Status: Controlled  Epilepsy Syndrome: Localization-related epilepsy unspecified  Epilepsy Syndrome Status: Undetermined - Evaluation in Progress  Age of Onset: Age 10  Etiology  : Unknown  Other Relevant Dx/ Issues: suspected ASD     Tests/Surgery History  Last EE2018    Seizure Record  Current Visit Date: 20  Previous Visit Date: 20  Months since last visit: 5.72  Seizure Type 1: Tonic-clonic seizures  Description of Sz Type 1: family describes fall, stiffening and shaking. he is unresponsive with tongue bite and urinary incontinence.  # of Type 1 Seizure since last visit: 0  Freq. Type 1 / Month: 0      Background History:  Peruvian male seizure onset 11 yo. EEG with multifocal (Lt T, Rt T, Lt PS, Rt PS) epileptiform discharges. Severe epilepsy reportedly with daily convulsive seizures while in [place] but reportedly with complete control following [place]. Mild to mod DE and poss ASD. CDI MRI normal with nl hippocampal volume.    History of Present Illness:     No seizures since last visit. Last seizure occurred in 2018, when he was started on carbamazepine.  He has not had seizures since carbamazepine was initiated.    Current Outpatient Medications   Medication Sig Dispense Refill     carBAMazepine (TEGRETOL XR) 200 MG 12 hr tablet Take 2 tablets (400 mg) by mouth 2 times daily 124 tablet 11     ibuprofen (ADVIL/MOTRIN) 200 MG tablet Take 3  tablets (600 mg) by mouth every 6 hours as needed for mild pain or fever 60 tablet 0     QUEtiapine (SEROQUEL) 50 MG tablet TAKE 1 TABLET BY MOUTH EVERY NIGHT AS NEEDED 30 tablet 3     QUEtiapine (SEROQUEL) 50 MG tablet TAKE 1 TABLET(50 MG) BY MOUTH EVERY NIGHT AS NEEDED 30 tablet 0        Medication Notes:    No side effects.    AED Medication Compliance:  compliant all of the time  Using a pill box:  No    Review of Systems:  Denies headaches. Denies loss of vision. Denies double vision. Denies dysphagia. Denies cough, wheezing, hemoptysis. Denies chest pain, leg swelling. Denies significant weight change, abdominal pain, nausea, vomiting, change in bowel habits, blood per rectum. Denies dysuria, hematuria, flank pain, or kidney stones.  Have you experienced a traumatic fall since your last visit: NO  Are these falls related to your seizures: Not Applicable    Other Issues:    Continues with behavioral concerns. Very argumentative and angry with mother and family.  E.g. gets upset when walks past children driving trikes or bicycles and expresses belief that they are about to ride into him.  No violence. Irritable. Stomps feet, obsesses about items he is upset about. Does not cry.  Denies suicidal ideation. Appetite is good but lost 30 pounds over last two years.  Family still has not started vitamin D.  Is patient safe to drive:  No    Exam:    /77   Pulse 73   Temp 97.7  F (36.5  C)   Wt 137 lb 12.8 oz (62.5 kg)   BMI 23.65 kg/m       Wt Readings from Last 5 Encounters:   09/02/20 137 lb 12.8 oz (62.5 kg)   03/12/20 141 lb 3.2 oz (64 kg)   05/20/19 158 lb 4.8 oz (71.8 kg)   03/19/19 165 lb (74.8 kg)   11/15/18 167 lb (75.8 kg)     Speaks only Montenegrin and appears mildly to moderately impaired. Mental status examination difficult to obtain through ; not clear how much is translation, how much is impairment, how much not cooperation. Alert. Oriented to day. Cannot tell me what month or year is but  "states that \"I don't pay attention to such things\". Cannot tell me what city he is in; knows he is in a physicians office. Cannot tell me on what floor. Follows simple but not complicated verbal commands. As best as  can tell he is fluent in Citizen of [place] but simple ideas. Normal gait. Suspicious, depressed appearance. Denies auditory hallucinations but not clear that he understands the question. Visual fields full to threat. Extraocular movements intact without nystagmus. Inpaired frontal saccades. Smile symmetrical. Tongue midline and strong. No drift, pronation, or tremor. Tone is normal. Reflexes symmetrical. Finger finger nose is done well.    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 Per family under complete control with high therapeutic carbamazepine levels.  2) Developmental delay, irritability. Normal MRI. Irritability; unclear if depression, ASD, perhaps some tendency toward paranoia. Difficult to assess through language barrier. Does not appear overtly psychotic. Psychiatric visit has not occurred.  3) Vitamin D deficiency; family has not pursued treatment as previously discussed.    PLAN:  1) Continue current carbamazepine.  2) Psychiatry evaluation at [facility] if possible.  3) Asked family to obtain Vitamin D 2000 international unit(s) at pharmacy and to administer daily. Specific instructions written; asked  to translate.  4) RTC 9 months.    In person time today 22 minutes, more than half counselling and coordinating cares.    Clay Dyson MD    "

## 2020-10-14 ENCOUNTER — OFFICE VISIT (OUTPATIENT)
Dept: INTERNAL MEDICINE | Facility: CLINIC | Age: 25
End: 2020-10-14
Payer: COMMERCIAL

## 2020-10-14 VITALS
HEART RATE: 69 BPM | BODY MASS INDEX: 23.65 KG/M2 | TEMPERATURE: 98.3 F | SYSTOLIC BLOOD PRESSURE: 106 MMHG | OXYGEN SATURATION: 100 % | WEIGHT: 137.8 LBS | DIASTOLIC BLOOD PRESSURE: 71 MMHG

## 2020-10-14 DIAGNOSIS — R56.9 SEIZURE (H): Primary | ICD-10-CM

## 2020-10-14 DIAGNOSIS — Z23 ENCOUNTER FOR IMMUNIZATION: ICD-10-CM

## 2020-10-14 DIAGNOSIS — R63.4 WEIGHT LOSS: ICD-10-CM

## 2020-10-14 DIAGNOSIS — F09 COGNITIVE DISORDER: ICD-10-CM

## 2020-10-14 DIAGNOSIS — F41.9 ANXIETY: ICD-10-CM

## 2020-10-14 LAB
BASOPHILS # BLD AUTO: 0 10E9/L (ref 0–0.2)
BASOPHILS NFR BLD AUTO: 0.3 %
CARBAMAZEPINE SERPL-MCNC: 14.2 MG/L (ref 4–12)
DIFFERENTIAL METHOD BLD: ABNORMAL
EOSINOPHIL # BLD AUTO: 0.2 10E9/L (ref 0–0.7)
EOSINOPHIL NFR BLD AUTO: 4.5 %
ERYTHROCYTE [DISTWIDTH] IN BLOOD BY AUTOMATED COUNT: 12.1 % (ref 10–15)
HCT VFR BLD AUTO: 45.5 % (ref 40–53)
HGB BLD-MCNC: 15.8 G/DL (ref 13.3–17.7)
LYMPHOCYTES # BLD AUTO: 1.8 10E9/L (ref 0.8–5.3)
LYMPHOCYTES NFR BLD AUTO: 47.6 %
MCH RBC QN AUTO: 31.1 PG (ref 26.5–33)
MCHC RBC AUTO-ENTMCNC: 34.7 G/DL (ref 31.5–36.5)
MCV RBC AUTO: 90 FL (ref 78–100)
MONOCYTES # BLD AUTO: 0.4 10E9/L (ref 0–1.3)
MONOCYTES NFR BLD AUTO: 10.2 %
NEUTROPHILS # BLD AUTO: 1.4 10E9/L (ref 1.6–8.3)
NEUTROPHILS NFR BLD AUTO: 37.4 %
PLATELET # BLD AUTO: 131 10E9/L (ref 150–450)
RBC # BLD AUTO: 5.08 10E12/L (ref 4.4–5.9)
VIT B12 SERPL-MCNC: 516 PG/ML (ref 193–986)
WBC # BLD AUTO: 3.7 10E9/L (ref 4–11)

## 2020-10-14 PROCEDURE — 90471 IMMUNIZATION ADMIN: CPT | Performed by: NURSE PRACTITIONER

## 2020-10-14 PROCEDURE — 36415 COLL VENOUS BLD VENIPUNCTURE: CPT | Performed by: NURSE PRACTITIONER

## 2020-10-14 PROCEDURE — 80156 ASSAY CARBAMAZEPINE TOTAL: CPT | Performed by: NURSE PRACTITIONER

## 2020-10-14 PROCEDURE — 82306 VITAMIN D 25 HYDROXY: CPT | Performed by: NURSE PRACTITIONER

## 2020-10-14 PROCEDURE — 90686 IIV4 VACC NO PRSV 0.5 ML IM: CPT | Performed by: NURSE PRACTITIONER

## 2020-10-14 PROCEDURE — 80053 COMPREHEN METABOLIC PANEL: CPT | Performed by: NURSE PRACTITIONER

## 2020-10-14 PROCEDURE — 82607 VITAMIN B-12: CPT | Performed by: NURSE PRACTITIONER

## 2020-10-14 PROCEDURE — 85025 COMPLETE CBC W/AUTO DIFF WBC: CPT | Performed by: NURSE PRACTITIONER

## 2020-10-14 PROCEDURE — 99214 OFFICE O/P EST MOD 30 MIN: CPT | Mod: 25 | Performed by: NURSE PRACTITIONER

## 2020-10-14 NOTE — PATIENT INSTRUCTIONS
Flu shot given today    Go to [address] for labs    Follow-up in 1 month to review labs and see how he is doing      Preventive Health Recommendations  Male Ages 21 - 25     Yearly exam:             See your health care provider every year in order to  o   Review health changes.   o   Discuss preventive care.    o   Review your medicines if your doctor has prescribed any.    You should be tested each year for STDs (sexually transmitted diseases).     Talk to your provider about cholesterol testing.      If you are at risk for diabetes, you should have a diabetes test (fasting glucose).    Shots: Get a flu shot each year. Get a tetanus shot every 10 years.     Nutrition:    Eat at least 5 servings of fruits and vegetables daily.     Eat whole-grain bread, whole-wheat pasta and brown rice instead of white grains and rice.     Get adequate calcium and Vitamin D.     Lifestyle    Exercise for at least 150 minutes a week (30 minutes a day, 5 days a week). This will help you control your weight and prevent disease.     Limit alcohol to one drink per day.     No smoking.     Wear sunscreen to prevent skin cancer.     See your dentist every six months for an exam and cleaning.

## 2020-10-14 NOTE — LETTER
[facility]  [address], [address]  [place] [address]  [phone]        October 15, 2020    René Smithud  [facility] [facility] [address]  [place] [address]            Dear Mr. René Kumar:      The results of your recent blood work is essentially within normal limits.  The Carbamazepine level is a little elevated, share with neurologist who sees you for your seizures.  No indication why you are losing weight.  After review of your records, you were down 4 pounds since March 2020 (141 to 137 with recent visit).  Family was concerned about a 30 pound weight loss.   If you have any further questions or problems, please contact our office.    Sincerely,        Amy Parks    Results for orders placed or performed in visit on 10/14/20   Comprehensive metabolic panel     Status: Abnormal   Result Value Ref Range    Sodium 139 133 - 144 mmol/L    Potassium 3.9 3.4 - 5.3 mmol/L    Chloride 105 94 - 109 mmol/L    Carbon Dioxide 27 20 - 32 mmol/L    Anion Gap 7 3 - 14 mmol/L    Glucose 75 70 - 99 mg/dL    Urea Nitrogen 5 (L) 7 - 30 mg/dL    Creatinine 0.81 0.66 - 1.25 mg/dL    GFR Estimate >90 >60 mL/min/[1.73_m2]    GFR Estimate If Black >90 >60 mL/min/[1.73_m2]    Calcium 8.8 8.5 - 10.1 mg/dL    Bilirubin Total 0.3 0.2 - 1.3 mg/dL    Albumin 4.1 3.4 - 5.0 g/dL    Protein Total 7.7 6.8 - 8.8 g/dL    Alkaline Phosphatase 94 40 - 150 U/L    ALT 32 0 - 70 U/L    AST 24 0 - 45 U/L   CBC with platelets differential     Status: Abnormal   Result Value Ref Range    WBC 3.7 (L) 4.0 - 11.0 10e9/L    RBC Count 5.08 4.4 - 5.9 10e12/L    Hemoglobin 15.8 13.3 - 17.7 g/dL    Hematocrit 45.5 40.0 - 53.0 %    MCV 90 78 - 100 fl    MCH 31.1 26.5 - 33.0 pg    MCHC 34.7 31.5 - 36.5 g/dL    RDW 12.1 10.0 - 15.0 %    Platelet Count 131 (L) 150 - 450 10e9/L    % Neutrophils 37.4 %    % Lymphocytes 47.6 %    % Monocytes 10.2 %    % Eosinophils 4.5 %    % Basophils 0.3 %    Absolute Neutrophil 1.4 (L) 1.6 - 8.3  10e9/L    Absolute Lymphocytes 1.8 0.8 - 5.3 10e9/L    Absolute Monocytes 0.4 0.0 - 1.3 10e9/L    Absolute Eosinophils 0.2 0.0 - 0.7 10e9/L    Absolute Basophils 0.0 0.0 - 0.2 10e9/L    Diff Method Automated Method    Vitamin B12     Status: None   Result Value Ref Range    Vitamin B12 516 193 - 986 pg/mL   Vitamin D Deficiency     Status: None   Result Value Ref Range    Vitamin D Deficiency screening 24 20 - 75 ug/L   Carbamazepine total     Status: Abnormal   Result Value Ref Range    Carbamazepine Level 14.2 (H) 4.0 - 12.0 mg/L

## 2020-10-14 NOTE — PROGRESS NOTES
Subjective     René Kumar is a 24 year old male who presents to clinic today for the following health issues:    HPI       Per [place]ian  and with brother who was in attendance, concerned about René weight loss over the past 3 months some 30 pounds is reported.  Elvie has cognitive impairment, quiet, does not say much, and lives with his mother.  Per  and brother who speaks very limited English reports that patient's appetite is good.  No fever or cough.  No shortness of breathe or chest pain.  Denies stomach issues. No urination issues.  No joint pain.    Reviewed PMH, medications, any labs (last one 2018), etc.  Last PCP visit  5/2019.  He recently saw neurology for seizure disorder.and on Carbamazepine and Seroquel for anxiety/sleep.       Review of Systems   Constitutional, HEENT, cardiovascular, pulmonary, GI, , musculoskeletal, neuro, skin, endocrine and psych systems are negative, except as otherwise noted.      Objective    /71 (BP Location: Right arm, Patient Position: Sitting, Cuff Size: Adult Regular)   Pulse 69   Temp 98.3  F (36.8  C) (Oral)   Wt 62.5 kg (137 lb 12.8 oz)   SpO2 100%   BMI 23.65 kg/m    Body mass index is 23.65 kg/m .     Physical Exam   GENERAL: alert and no distress; did not speak much just looked down  NECK: no adenopathy, no asymmetry, masses, or scars and thyroid normal to palpation  RESP: lungs clear to auscultation - no rales, rhonchi or wheezes  CV: regular rate and rhythm, normal S1 S2, no S3 or S4, no murmur, click or rub, no peripheral edema and peripheral pulses strong  ABDOMEN: soft, nontender, no hepatosplenomegaly, no masses and bowel sounds normal  MS: no gross musculoskeletal defects noted, no edema  SKIN: no suspicious lesions or rashes  PSYCH:  affect quit but polite        Assessment & Plan     Weight loss  - per  and brother down 30 pounds but per medical record he is down from 141 to 137 pounds since  3/20/2020 so will check labs:  - Comprehensive metabolic panel  - CBC with platelets differential  - Vitamin B12  - Vitamin D Deficiency    Seizure (H)  - followed by neurology with last appt on 9/3/2020 and on Carbamazepine  - no recent labs:  - Comprehensive metabolic panel  - Carbamazepine total    Cognitive disorder  - lives with mother and so most information gathered by brother thru   - Comprehensive metabolic panel  - Vitamin D Deficiency    Anxiety  - on Seroquel for anxiety and sleep  - Comprehensive metabolic panel  - Vitamin D Deficiency    Encounter for immunization  - INFLUENZA VACCINE IM > 6 MONTHS VALENT IIV4 [[id number]]        Patient instructions:  See above given to brother using     Return in about 4 weeks (around 11/11/2020) for Follow up, in person;  + review labs.    Amy Parks CNP  [place] [address]  SUBJECTIVE:   .

## 2020-10-15 LAB
ALBUMIN SERPL-MCNC: 4.1 G/DL (ref 3.4–5)
ALP SERPL-CCNC: 94 U/L (ref 40–150)
ALT SERPL W P-5'-P-CCNC: 32 U/L (ref 0–70)
ANION GAP SERPL CALCULATED.3IONS-SCNC: 7 MMOL/L (ref 3–14)
AST SERPL W P-5'-P-CCNC: 24 U/L (ref 0–45)
BILIRUB SERPL-MCNC: 0.3 MG/DL (ref 0.2–1.3)
BUN SERPL-MCNC: 5 MG/DL (ref 7–30)
CALCIUM SERPL-MCNC: 8.8 MG/DL (ref 8.5–10.1)
CHLORIDE SERPL-SCNC: 105 MMOL/L (ref 94–109)
CO2 SERPL-SCNC: 27 MMOL/L (ref 20–32)
CREAT SERPL-MCNC: 0.81 MG/DL (ref 0.66–1.25)
DEPRECATED CALCIDIOL+CALCIFEROL SERPL-MC: 24 UG/L (ref 20–75)
GFR SERPL CREATININE-BSD FRML MDRD: >90 ML/MIN/{1.73_M2}
GLUCOSE SERPL-MCNC: 75 MG/DL (ref 70–99)
POTASSIUM SERPL-SCNC: 3.9 MMOL/L (ref 3.4–5.3)
PROT SERPL-MCNC: 7.7 G/DL (ref 6.8–8.8)
SODIUM SERPL-SCNC: 139 MMOL/L (ref 133–144)

## 2020-12-09 DIAGNOSIS — F84.0 AUTISM: ICD-10-CM

## 2020-12-09 NOTE — TELEPHONE ENCOUNTER
"Requested Prescriptions   Pending Prescriptions Disp Refills     QUEtiapine (SEROQUEL) 50 MG tablet  Last Written Prescription Date:  08/06/19  Last Fill Quantity: 30,  # refills: 0   Last office visit: 10/14/2020 with prescribing provider:  10/14/20   Future Office Visit:           30 tablet 0       Antipsychotic Medications Failed - 12/9/2020 11:04 AM        Failed - Lipid panel on file within the past 12 months     No lab results found.            Passed - Blood pressure under 140/90 in past 12 months     BP Readings from Last 3 Encounters:   10/14/20 106/71   09/02/20 122/77   03/12/20 117/53                 Passed - Patient is 12 years of age or older        Passed - CBC on file in past 12 months     Recent Labs   Lab Test 10/14/20  1212   WBC 3.7*   RBC 5.08   HGB 15.8   HCT 45.5   *                 Passed - Heart Rate on file within past 12 months     Pulse Readings from Last 3 Encounters:   10/14/20 69   09/02/20 73   03/12/20 78               Passed - A1c or Glucose on file in past 12 months     Recent Labs   Lab Test 10/14/20  1212   GLC 75       Please review patients last 3 weights. If a weight gain of >10 lbs exists, you may refill the prescription once after instructing the patient to schedule an appointment within the next 30 days.    Wt Readings from Last 3 Encounters:   10/14/20 62.5 kg (137 lb 12.8 oz)   09/02/20 62.5 kg (137 lb 12.8 oz)   03/12/20 64 kg (141 lb 3.2 oz)             Passed - Medication is active on med list        Passed - Recent (6 mo) or future (30 days) visit within the authorizing provider's specialty     Patient had office visit in the last 6 months or has a visit in the next 30 days with authorizing provider or within the authorizing provider's specialty.  See \"Patient Info\" tab in inbasket, or \"Choose Columns\" in Meds & Orders section of the refill encounter.                 "

## 2020-12-10 ENCOUNTER — VIRTUAL VISIT (OUTPATIENT)
Dept: PSYCHIATRY | Facility: CLINIC | Age: 25
End: 2020-12-10
Payer: COMMERCIAL

## 2020-12-10 DIAGNOSIS — Z53.9 ERRONEOUS ENCOUNTER--DISREGARD: Primary | ICD-10-CM

## 2020-12-10 RX ORDER — QUETIAPINE FUMARATE 50 MG/1
TABLET, FILM COATED ORAL
Qty: 30 TABLET | Refills: 0 | Status: SHIPPED | OUTPATIENT
Start: 2020-12-10 | End: 2021-01-12

## 2020-12-10 NOTE — TELEPHONE ENCOUNTER
Routing refill request to provider for review/approval because:  Labs not current:  No lipid panel found  Break in medication

## 2021-01-10 DIAGNOSIS — F84.0 AUTISM: ICD-10-CM

## 2021-01-11 NOTE — TELEPHONE ENCOUNTER
Pending Prescriptions:                       Disp   Refills    QUEtiapine (SEROQUEL) 50 MG tablet [Pharma*30 tab*0        Sig: TAKE 1 TABLET(50 MG) BY MOUTH EVERY NIGHT AS NEEDED      Routing refill request to provider for review/approval because:  Labs not current    Please advise, thanks.

## 2021-01-12 RX ORDER — QUETIAPINE FUMARATE 50 MG/1
TABLET, FILM COATED ORAL
Qty: 30 TABLET | Refills: 0 | Status: SHIPPED | OUTPATIENT
Start: 2021-01-12 | End: 2021-02-10

## 2021-01-28 NOTE — PROGRESS NOTES
This encounter was created in error. Patient did not arrive for appointment.    RY Live MD  [facility]  Department of Psychiatry

## 2021-02-09 DIAGNOSIS — F84.0 AUTISM: ICD-10-CM

## 2021-02-10 RX ORDER — QUETIAPINE FUMARATE 50 MG/1
TABLET, FILM COATED ORAL
Qty: 30 TABLET | Refills: 0 | Status: SHIPPED | OUTPATIENT
Start: 2021-02-10 | End: 2021-03-15

## 2021-02-10 NOTE — TELEPHONE ENCOUNTER
Routing refill request to provider for review/approval because:  Labs not current:  Lipid panel

## 2021-03-13 DIAGNOSIS — F84.0 AUTISM: ICD-10-CM

## 2021-03-15 RX ORDER — QUETIAPINE FUMARATE 50 MG/1
TABLET, FILM COATED ORAL
Qty: 30 TABLET | Refills: 0 | Status: SHIPPED | OUTPATIENT
Start: 2021-03-15 | End: 2021-04-15

## 2021-03-15 NOTE — TELEPHONE ENCOUNTER
Pending Prescriptions:                       Disp   Refills    QUEtiapine (SEROQUEL) 50 MG tablet [Pharma*30 tab*0        Sig: TAKE 1 TABLET(50 MG) BY MOUTH EVERY NIGHT AS NEEDED    Routing refill request to provider for review/approval because:  No results found for: LDL

## 2021-04-13 DIAGNOSIS — F84.0 AUTISM: ICD-10-CM

## 2021-04-15 DIAGNOSIS — G40.909 SEIZURE DISORDER (H): ICD-10-CM

## 2021-04-15 RX ORDER — QUETIAPINE FUMARATE 50 MG/1
TABLET, FILM COATED ORAL
Qty: 30 TABLET | Refills: 0 | Status: SHIPPED | OUTPATIENT
Start: 2021-04-15 | End: 2021-05-18

## 2021-04-15 NOTE — TELEPHONE ENCOUNTER
Routing refill request to provider for review/approval because:  Per RN protocol needs Lipids    Jocelynn Dooley RN

## 2021-04-19 RX ORDER — CARBAMAZEPINE 200 MG/1
400 TABLET, EXTENDED RELEASE ORAL 2 TIMES DAILY
Qty: 124 TABLET | Refills: 1 | Status: SHIPPED | OUTPATIENT
Start: 2021-04-19 | End: 2021-04-21

## 2021-04-19 NOTE — TELEPHONE ENCOUNTER
CARBAMAZEPINE ER 200MG TABLETS      Last Written Prescription Date:  3-12-20  Last Fill Quantity: 124,   # refills: 11  Last Office Visit : 9-2-20 (RTC 9 M)  Future Office visit:  None      Sodium   Date Value Ref Range Status   10/14/2020 139 133 - 144 mmol/L Final     Outside Lab: AED, CBC  Note in result tab-10-15-20  The Carbamazepine level is a little elevated to share with neurologist who he sees for his seizures    Routing refill request to provider for review/approval because:  Abnormal  CBC, AED: ordered/reviewed by other provider/service

## 2021-04-20 ENCOUNTER — TELEPHONE (OUTPATIENT)
Dept: NEUROLOGY | Facility: CLINIC | Age: 26
End: 2021-04-20

## 2021-04-20 NOTE — TELEPHONE ENCOUNTER
LVM x1 for return appt per gopi, Please call and ask patient to make follow up visit. We have provided presecription but cannot provide more prescriptions without follow up visit and blood work

## 2021-04-21 ENCOUNTER — OFFICE VISIT (OUTPATIENT)
Dept: NEUROLOGY | Facility: CLINIC | Age: 26
End: 2021-04-21
Payer: COMMERCIAL

## 2021-04-21 VITALS
TEMPERATURE: 97.2 F | WEIGHT: 142.2 LBS | HEART RATE: 58 BPM | BODY MASS INDEX: 24.41 KG/M2 | SYSTOLIC BLOOD PRESSURE: 110 MMHG | DIASTOLIC BLOOD PRESSURE: 76 MMHG

## 2021-04-21 DIAGNOSIS — G40.211 PARTIAL SYMPTOMATIC EPILEPSY WITH COMPLEX PARTIAL SEIZURES, INTRACTABLE, WITH STATUS EPILEPTICUS (H): ICD-10-CM

## 2021-04-21 DIAGNOSIS — G40.909 SEIZURE DISORDER (H): ICD-10-CM

## 2021-04-21 LAB
CARBAMAZEPINE SERPL-MCNC: 11.8 MG/L (ref 4–12)
SODIUM SERPL-SCNC: 137 MMOL/L (ref 133–144)

## 2021-04-21 RX ORDER — CARBAMAZEPINE 200 MG/1
TABLET, EXTENDED RELEASE ORAL
Qty: 124 TABLET | Refills: 11 | Status: SHIPPED | OUTPATIENT
Start: 2021-04-21 | End: 2022-08-11

## 2021-04-21 NOTE — PROGRESS NOTES
[facility] Epilepsy Care Progress Note      Patient:  René Kumar  :  1995   Age:  25 year old   Today's Office Visit:  2021    Epilepsy Data:    Patient History  Primary Epileptologist/Provider: Clay Dyson M.D.  Patient Status: Controlled  Epilepsy Syndrome: Localization-related epilepsy unspecified  Epilepsy Syndrome Status: Undetermined - Evaluation in Progress  Age of Onset: Age 10  Etiology  : Unknown  Other Relevant Dx/ Issues: suspected ASD     Tests/Surgery History  Last EE2018    Seizure Record  Current Visit Date: 21  Previous Visit Date: 20  Months since last visit: 7.59  Seizure Type 1: Tonic-clonic seizures  Description of Sz Type 1: family describes fall, stiffening and shaking. he is unresponsive with tongue bite and urinary incontinence.  # of Type 1 Seizure since last visit: 0  Freq. Type 1 / Month: 0    History of Present Illness:     No seizures since last seen. Last seizure occurred in 2018.    Current Outpatient Medications   Medication Sig Dispense Refill     carBAMazepine (TEGRETOL XR) 200 MG 12 hr tablet Take 2 tablets (400 mg) by mouth 2 times daily For additional refills, please schedule a follow-up appointment. 124 tablet 1     QUEtiapine (SEROQUEL) 50 MG tablet TAKE 1 TABLET(50 MG) BY MOUTH EVERY NIGHT AS NEEDED 30 tablet 0     levETIRAcetam (KEPPRA PO)         Medication Notes:    It took us a long time to sort out what medication patient is taking.  We finally decided he takes two pills twice daily.  AED Medication Compliance:  compliant all of the time  No side effects per brother.    Review of Systems:  No vomiting, diarrhea, fevers, hematuria or kidney stones.  Have you experienced a traumatic fall since your last visit: NO  Are these falls related to your seizures: Not Applicable    Other Issues:  Brother still reports anger problems. Missed appointment with psychatiry.  Is patient safe to drive:  No    Exam:    /76  (BP Location: Left arm, Patient Position: Sitting, Cuff Size: Adult Regular)   Pulse 58   Temp 97.2  F (36.2  C) (Temporal)   Wt 142 lb 3.2 oz (64.5 kg)   BMI 24.41 kg/m       Wt Readings from Last 5 Encounters:   04/21/21 142 lb 3.2 oz (64.5 kg)   10/14/20 137 lb 12.8 oz (62.5 kg)   09/02/20 137 lb 12.8 oz (62.5 kg)   03/12/20 141 lb 3.2 oz (64 kg)   05/20/19 158 lb 4.8 oz (71.8 kg)     Speaks only Cymra[place] and appears mildly to moderately impaired. Visual fields full to threat. Extraocular movements intact without nystagmus.   Inpaired frontal saccades. Smile symmetrical. Tongue midline and strong. No drift, pronation, or tremor. Tone is normal. Reflexes symmetrical. Finger finger nose is done well.    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 Per family under complete control with high therapeutic carbamazepine levels.  2) Developmental delay, irritability. Normal MRI. Unclear if depression, ASD, perhaps some tendency toward paranoia. Difficult to assess through language barrier. Does not appear overtly psychotic. Psychiatric visit has not occurred; family missed.  3) Vitamin D deficiency; family has not pursued treatment as previously discussed.     Results for WOODY MORALES (MRN [id number]) as of 4/21/2021 16:59   Ref. Range 3/29/2018 08:38 9/19/2018 14:59 10/14/2020 12:12   Carbamazepine Level Latest Ref Range: 4.0 - 12.0 mg/L 14.5 (H) 12.3 (H) 14.2 (H)     Sodium = 139. B12 = 516, Vit D = 24.    PLAN:  1) Continue current carbamazepine. Carbamazepine level today to follow up on previous high level. Sodium to exclude toxicity.  2) Will try to arrange one more psychiatry evaluation at [facility] if possible.  3) Again asked family to obtain Vitamin D 2000 international unit(s) at pharmacy and to administer daily. Specific instructions written; asked  to translate.  4) RTC 9 months.     In person time today 24  minutes. We spent about 8 minutes of that time trying to get  software to work. Five minutes review. 7 minutes to generate notes after visit. So total 39 minutes on day of visit. Required discussion with family as patient could not provide information. Reviewed four lab tests.    Clay Dyson MD

## 2021-04-21 NOTE — LETTER
2021       RE: René Kumar  : 1995   MRN: [id number]      Dear Colleague,    Thank you for referring your patient, René Kumar, to the [facility] EPILEPSY CARE at [facility]. Please see a copy of my visit note below.    [facility] Epilepsy Care Progress Note      Patient:  René Kumar  :  1995   Age:  25 year old   Today's Office Visit:  2021    Epilepsy Data:    Patient History  Primary Epileptologist/Provider: Clay Dyson M.D.  Patient Status: Controlled  Epilepsy Syndrome: Localization-related epilepsy unspecified  Epilepsy Syndrome Status: Undetermined - Evaluation in Progress  Age of Onset: Age 10  Etiology  : Unknown  Other Relevant Dx/ Issues: suspected ASD     Tests/Surgery History  Last EE2018    Seizure Record  Current Visit Date: 21  Previous Visit Date: 20  Months since last visit: 7.59  Seizure Type 1: Tonic-clonic seizures  Description of Sz Type 1: family describes fall, stiffening and shaking. he is unresponsive with tongue bite and urinary incontinence.  # of Type 1 Seizure since last visit: 0  Freq. Type 1 / Month: 0    History of Present Illness:     No seizures since last seen. Last seizure occurred in 2018.    Current Outpatient Medications   Medication Sig Dispense Refill     carBAMazepine (TEGRETOL XR) 200 MG 12 hr tablet Take 2 tablets (400 mg) by mouth 2 times daily For additional refills, please schedule a follow-up appointment. 124 tablet 1     QUEtiapine (SEROQUEL) 50 MG tablet TAKE 1 TABLET(50 MG) BY MOUTH EVERY NIGHT AS NEEDED 30 tablet 0     levETIRAcetam (KEPPRA PO)         Medication Notes:    It took us a long time to sort out what medication patient is taking.  We finally decided he takes two pills twice daily.  AED Medication Compliance:  compliant all of the time  No side effects per brother.    Review of Systems:  No vomiting, diarrhea, fevers,  hematuria or kidney stones.  Have you experienced a traumatic fall since your last visit: NO  Are these falls related to your seizures: Not Applicable    Other Issues:  Brother still reports anger problems. Missed appointment with psychatiry.  Is patient safe to drive:  No    Exam:    /76 (BP Location: Left arm, Patient Position: Sitting, Cuff Size: Adult Regular)   Pulse 58   Temp 97.2  F (36.2  C) (Temporal)   Wt 142 lb 3.2 oz (64.5 kg)   BMI 24.41 kg/m       Wt Readings from Last 5 Encounters:   04/21/21 142 lb 3.2 oz (64.5 kg)   10/14/20 137 lb 12.8 oz (62.5 kg)   09/02/20 137 lb 12.8 oz (62.5 kg)   03/12/20 141 lb 3.2 oz (64 kg)   05/20/19 158 lb 4.8 oz (71.8 kg)     Speaks only [place]alander and appears mildly to moderately impaired. Visual fields full to threat. Extraocular movements intact without nystagmus.   Inpaired frontal saccades. Smile symmetrical. Tongue midline and strong. No drift, pronation, or tremor. Tone is normal. Reflexes symmetrical. Finger finger nose is done well.    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 Per family under complete control with high therapeutic carbamazepine levels.  2) Developmental delay, irritability. Normal MRI. Unclear if depression, ASD, perhaps some tendency toward paranoia. Difficult to assess through language barrier. Does not appear overtly psychotic. Psychiatric visit has not occurred; family missed.  3) Vitamin D deficiency; family has not pursued treatment as previously discussed.     Results for WOODY MORALES (MRN [id number]) as of 4/21/2021 16:59   Ref. Range 3/29/2018 08:38 9/19/2018 14:59 10/14/2020 12:12   Carbamazepine Level Latest Ref Range: 4.0 - 12.0 mg/L 14.5 (H) 12.3 (H) 14.2 (H)     Sodium = 139. B12 = 516, Vit D = 24.    PLAN:  1) Continue current carbamazepine. Carbamazepine level today to follow up on previous high level. Sodium to exclude toxicity.  2) Will  try to arrange one more psychiatry evaluation at [facility] if possible.  3) Again asked family to obtain Vitamin D 2000 international unit(s) at pharmacy and to administer daily. Specific instructions written; asked  to translate.  4) RTC 9 months.     In person time today 24 minutes. We spent about 8 minutes of that time trying to get  software to work. Five minutes review. 7 minutes to generate notes after visit. So total 39 minutes on day of visit. Required discussion with family as patient could not provide information. Reviewed four lab tests.    Clay Dyson MD

## 2021-05-16 DIAGNOSIS — F84.0 AUTISM: ICD-10-CM

## 2021-05-18 RX ORDER — QUETIAPINE FUMARATE 50 MG/1
TABLET, FILM COATED ORAL
Qty: 30 TABLET | Refills: 0 | Status: SHIPPED | OUTPATIENT
Start: 2021-05-18 | End: 2021-06-23

## 2021-05-18 NOTE — TELEPHONE ENCOUNTER
Routing refill request to provider for review/approval because:  Labs not current:  Lipid profile  Needs appt every 6 months per protocol  Eliane Casas RN, BSN

## 2021-05-24 ENCOUNTER — APPOINTMENT (OUTPATIENT)
Dept: CT IMAGING | Facility: CLINIC | Age: 26
End: 2021-05-24
Attending: EMERGENCY MEDICINE
Payer: COMMERCIAL

## 2021-05-24 ENCOUNTER — HOSPITAL ENCOUNTER (EMERGENCY)
Facility: CLINIC | Age: 26
Discharge: HOME | End: 2021-05-24
Attending: EMERGENCY MEDICINE | Admitting: EMERGENCY MEDICINE
Payer: COMMERCIAL

## 2021-05-24 VITALS
SYSTOLIC BLOOD PRESSURE: 96 MMHG | HEART RATE: 66 BPM | DIASTOLIC BLOOD PRESSURE: 68 MMHG | OXYGEN SATURATION: 97 % | RESPIRATION RATE: 16 BRPM

## 2021-05-24 DIAGNOSIS — G40.909 RECURRENT SEIZURES (H): ICD-10-CM

## 2021-05-24 LAB
ANION GAP SERPL CALCULATED.3IONS-SCNC: 4 MMOL/L (ref 3–14)
BASOPHILS # BLD AUTO: 0 10E9/L (ref 0–0.2)
BASOPHILS NFR BLD AUTO: 0.4 %
BUN SERPL-MCNC: 7 MG/DL (ref 7–30)
CALCIUM SERPL-MCNC: 8.8 MG/DL (ref 8.5–10.1)
CHLORIDE SERPL-SCNC: 106 MMOL/L (ref 94–109)
CO2 SERPL-SCNC: 28 MMOL/L (ref 20–32)
CREAT SERPL-MCNC: 0.72 MG/DL (ref 0.66–1.25)
DIFFERENTIAL METHOD BLD: NORMAL
EOSINOPHIL # BLD AUTO: 0.1 10E9/L (ref 0–0.7)
EOSINOPHIL NFR BLD AUTO: 1 %
ERYTHROCYTE [DISTWIDTH] IN BLOOD BY AUTOMATED COUNT: 11.4 % (ref 10–15)
GFR SERPL CREATININE-BSD FRML MDRD: >90 ML/MIN/{1.73_M2}
GLUCOSE SERPL-MCNC: 131 MG/DL (ref 70–99)
HCT VFR BLD AUTO: 42.7 % (ref 40–53)
HGB BLD-MCNC: 14.9 G/DL (ref 13.3–17.7)
IMM GRANULOCYTES # BLD: 0.1 10E9/L (ref 0–0.4)
IMM GRANULOCYTES NFR BLD: 1.8 %
LYMPHOCYTES # BLD AUTO: 0.9 10E9/L (ref 0.8–5.3)
LYMPHOCYTES NFR BLD AUTO: 18.7 %
MCH RBC QN AUTO: 31.4 PG (ref 26.5–33)
MCHC RBC AUTO-ENTMCNC: 34.9 G/DL (ref 31.5–36.5)
MCV RBC AUTO: 90 FL (ref 78–100)
MONOCYTES # BLD AUTO: 0.4 10E9/L (ref 0–1.3)
MONOCYTES NFR BLD AUTO: 7.4 %
NEUTROPHILS # BLD AUTO: 3.6 10E9/L (ref 1.6–8.3)
NEUTROPHILS NFR BLD AUTO: 70.7 %
NRBC # BLD AUTO: 0 10*3/UL
NRBC BLD AUTO-RTO: 0 /100
PLATELET # BLD AUTO: 182 10E9/L (ref 150–450)
POTASSIUM SERPL-SCNC: 3.5 MMOL/L (ref 3.4–5.3)
RBC # BLD AUTO: 4.75 10E12/L (ref 4.4–5.9)
SODIUM SERPL-SCNC: 138 MMOL/L (ref 133–144)
WBC # BLD AUTO: 5 10E9/L (ref 4–11)

## 2021-05-24 PROCEDURE — 258N000003 HC RX IP 258 OP 636: Performed by: EMERGENCY MEDICINE

## 2021-05-24 PROCEDURE — 93005 ELECTROCARDIOGRAM TRACING: CPT

## 2021-05-24 PROCEDURE — 250N000011 HC RX IP 250 OP 636: Performed by: EMERGENCY MEDICINE

## 2021-05-24 PROCEDURE — 70450 CT HEAD/BRAIN W/O DYE: CPT

## 2021-05-24 PROCEDURE — 80048 BASIC METABOLIC PNL TOTAL CA: CPT | Performed by: EMERGENCY MEDICINE

## 2021-05-24 PROCEDURE — 99285 EMERGENCY DEPT VISIT HI MDM: CPT | Mod: 25

## 2021-05-24 PROCEDURE — 96374 THER/PROPH/DIAG INJ IV PUSH: CPT

## 2021-05-24 PROCEDURE — 85025 COMPLETE CBC W/AUTO DIFF WBC: CPT | Performed by: EMERGENCY MEDICINE

## 2021-05-24 RX ORDER — LEVETIRACETAM 500 MG/1
500 TABLET ORAL 2 TIMES DAILY
Qty: 60 TABLET | Refills: 0 | Status: SHIPPED | OUTPATIENT
Start: 2021-05-24 | End: 2021-06-09 | Stop reason: SINTOL

## 2021-05-24 RX ADMIN — LEVETIRACETAM 1290 MG: 100 INJECTION, SOLUTION INTRAVENOUS at 03:24

## 2021-05-24 ASSESSMENT — ENCOUNTER SYMPTOMS
TREMORS: 1
SLEEP DISTURBANCE: 1
SEIZURES: 1

## 2021-05-24 NOTE — ED TRIAGE NOTES
Pt family called EMS reporting that patient had a seizure while he slept due to him being out of his medicine. Per ems, pt showed no seizure activity or being post ictal during transport. Pt states that he doesn't know what happened and that he feels fine. A+OX4, no acute signs of distress

## 2021-05-24 NOTE — ED PROVIDER NOTES
History   Chief Complaint:  Seizures       HPI   René Kumar is a 25 year old male with history of epilepsy who presents with seizures. Per EMS, the patient's family reports that he had a seizure while sleeping this morning. They think it has to do with him being out of his seizure medication. EMS did not notice any seizure activity on arrival. The patient says that he does not know what happened and than he feels fine. He denies tongue pain.     Review of Systems   Neurological: Positive for tremors and seizures.   Psychiatric/Behavioral: Positive for sleep disturbance.   All other systems reviewed and are negative.      Allergies:  No known allergies    Medications:  Tegretol  Keppra  Quetiapine    Past Medical History:    Cognitive disorder  Seizure  Partial symptomatic epilepsy with complex partial seizures    Past Surgical History:    No known past surgical history    Family History:    No known family history    Social History:  Arrives via EMS  Lives with family    Physical Exam     Patient Vitals for the past 24 hrs:   BP Pulse Resp SpO2   05/24/21 0545 95/65 63 -- 96 %   05/24/21 0530 103/64 63 -- 97 %   05/24/21 0515 108/74 73 -- 97 %   05/24/21 0500 106/72 70 -- 97 %   05/24/21 0445 105/74 73 -- 96 %   05/24/21 0430 108/71 66 -- 97 %   05/24/21 0400 -- -- -- 96 %   05/24/21 0330 -- 85 17 96 %   05/24/21 0315 115/77 89 20 96 %   05/24/21 0300 125/85 96 17 98 %   05/24/21 0245 136/83 93 -- 96 %       Physical Exam  Constitutional: Alert, attentive, mildly confused to recent events, possible developmental delay  HENT:    Nose: Nose normal.    Mouth/Throat: Oropharynx is clear, mucous membranes are moist  Eyes: EOM are normal. Pupils are equal, round, and reactive to light.   CV: Regular rate and rhythm, no murmurs, rubs or gallops.  Chest: Effort normal and breath sounds normal.   GI: No distension. There is no tenderness  MSK: Normal range of motion.   Neurological:   Cranial nerves 2-12 intact;    5/5 strength throughout the upper and lower extremities;   sensation intact to light touch throughout the upper and lower extremities;   2+ DTRs to the bilateral upper and lower extremities (biceps, BRs, patellar, achilles);   normal gait   No meningismus   Skin: Skin is warm and dry.        Emergency Department Course   ECG  ECG taken at 0250, ECG read at 0252  Normal sinus rhythm with sinus arrhythmia  Early repolarization  Normal ECG  Rate 93 bpm. AL interval 148 ms. QRS duration 74 ms. QT/QTc 328/407 ms. P-R-T axes 56 15 33.     Imaging:    CT Head w/o Contrast  1.  Normal head CT.    Read per radiology    Laboratory:    CBC: WBC 5.0, HGB 14.9,   BMP: glucose 131 (H) o/w WNL (Creatinine 0.72)      Emergency Department Course:    Reviewed:  I reviewed nursing notes, vitals, past medical history and care everywhere    Assessments:  0250 I obtained history and examined the patient as noted above.    I rechecked the patient and explained findings.  He indicates that his brother was sick and unable to refill his Keppra prescription, so he has been taking fewer or no medications recently.    Interventions:  0324 Keppra 1290 mg IV    Disposition:  Handed off to my partner at shift change.    Impression & Plan     Medical Decision Making:  This is a 25-year-old male with history of epilepsy and possible cognitive delay based on chart review who presents for evaluation of seizure.  Initial history is limited, but it may be that the patient has been not taking Keppra or taking lower dose than usual due to the delay in refilling the prescription.  Given initial concern for developmental delay, limited history leading up to the event, etc., CT head was performed to rule out potential head injury, skull fracture, or intracranial abnormality.  This is fortunately negative.  Screening labs are reassuring.  Patient has been loaded with Keppra.  Unfortunately, we have been unable to contact family and the patient does  not have a cell phone.  Patient advises that his brother-in-law, Tamir, should be contacted to pick him up.  Given multiple areas in the chart where the patient has documented concern for cognitive delay, he is likely unsafe to be discharged at this time without further evaluation of his social status.  Police have gone to the address of record in the patient's chart and no one answered the door.  Plan social work consult in the morning and to continue to attempt to contact family.    Update: The patient's aunt, Negrito [phone], called and apologize for not responding to her phone calls earlier this morning.  She will be able to come pick the patient up in approximately 8:00 if not earlier.    Diagnosis:    ICD-10-CM    1. Recurrent seizures (H)  G40.909        Discharge Medications:  New Prescriptions    LEVETIRACETAM (KEPPRA) 500 MG TABLET    Take 1 tablet (500 mg) by mouth 2 times daily       Scribe Disclosure:  Jeremy DA SILVA, am serving as a scribe at 2:52 AM on 5/24/2021 to document services personally performed by Isael Alcantar MD based on my observations and the provider's statements to me.              Isael Alcantar MD  05/24/21 [id number]

## 2021-05-25 LAB — INTERPRETATION ECG - MUSE: NORMAL

## 2021-06-09 ENCOUNTER — OFFICE VISIT (OUTPATIENT)
Dept: NEUROLOGY | Facility: CLINIC | Age: 26
End: 2021-06-09
Payer: COMMERCIAL

## 2021-06-09 VITALS
SYSTOLIC BLOOD PRESSURE: 112 MMHG | TEMPERATURE: 97.5 F | HEART RATE: 56 BPM | BODY MASS INDEX: 23.89 KG/M2 | DIASTOLIC BLOOD PRESSURE: 75 MMHG | WEIGHT: 139.2 LBS

## 2021-06-09 DIAGNOSIS — G40.211 PARTIAL SYMPTOMATIC EPILEPSY WITH COMPLEX PARTIAL SEIZURES, INTRACTABLE, WITH STATUS EPILEPTICUS (H): Primary | ICD-10-CM

## 2021-06-09 LAB
AST SERPL W P-5'-P-CCNC: 22 U/L (ref 0–45)
CARBAMAZEPINE SERPL-MCNC: 13.8 MG/L (ref 4–12)
SODIUM SERPL-SCNC: 140 MMOL/L (ref 133–144)

## 2021-06-09 RX ORDER — DIVALPROEX SODIUM 250 MG/1
TABLET, DELAYED RELEASE ORAL
Qty: 62 TABLET | Refills: 7 | Status: SHIPPED | OUTPATIENT
Start: 2021-06-09 | End: 2021-11-04

## 2021-06-09 NOTE — PROGRESS NOTES
[facility] Epilepsy Care Progress Note      Patient:  René Kumar  :  1995   Age:  25 year old   Today's Office Visit:  2021    Epilepsy Data:    Patient History  Primary Epileptologist/Provider: Clay Dyson M.D.  Patient Status: Controlled  Epilepsy Syndrome: Localization-related epilepsy unspecified  Epilepsy Syndrome Status: Undetermined - Evaluation in Progress  Age of Onset: Age 10  Etiology  : Unknown  Other Relevant Dx/ Issues: suspected ASD     Tests/Surgery History  Last EE2018    Seizure Record  Current Visit Date: 21  Previous Visit Date: 21  Months since last visit: 1.61  Seizure Type 1: Tonic-clonic seizures  Description of Sz Type 1: family describes fall, stiffening and shaking. he is unresponsive with tongue bite and urinary incontinence.  # of Type 1 Seizure since last visit: 3  Freq. Type 1 / Month: 1.86    History of Present Illness:   Patient presents with brother. They speak no English. There were moderate difficulties getting  virtually and connection difficulties throughout the interaction. There were also some challenges getting focused responses from the family.    Patient's brother reports three seizures on 2021. They presented to ED. Brother today states that patient had been taking the carbamazepine ER regularly before they had the three seizures. This is different from ED statement but ED note does not acknowledge that patient was taking carbamazepine. Brother reports they were going to run out of the carbamazepine but they had not yet done so.    We asked brother to describe seizures and brother reported fall stiffening and jerking with unresponsiveness.    Brother denies obvious seizure precipitants. Taking medications as directed. No fevers, diarrhea, vomiting. No new medications. No significant sleep deprivation, alcohol use, street drug use. No new significant stressors.    Levetiracetam was initiated in the  ED but it caused sleeplessness and so patient stopped taking it and continued with carbamazepine at previous dose.      Current Outpatient Medications   Medication Sig Dispense Refill     carBAMazepine (TEGRETOL XR) 200 MG 12 hr tablet Take two pills twice daily (total 800 mg per day). 124 tablet 11     levETIRAcetam (KEPPRA) 500 MG tablet Take 1 tablet (500 mg) by mouth 2 times daily 60 tablet 0     QUEtiapine (SEROQUEL) 50 MG tablet TAKE 1 TABLET(50 MG) BY MOUTH EVERY NIGHT AS NEEDED 30 tablet 0        Medication Notes:    Not currently taking levetiracetam; they stopped taking it.  AED Medication Compliance:  compliant all of the time  Using a pill box:  No    Review of Systems:  No vomiting, diarrhea, fevers, hematuria or kidney stones.  Have you experienced a traumatic fall since your last visit: NO  Are these falls related to your seizures: Not Applicable    Other Issues:  Brother continues reporting that patient is irritable and gets angry at mother. No assaultive activity. We had asked psychiatry to consult; brother reports they called but stated that they could not help and that patient should see a neurologist for this matter. I cannot find documentation of this in chart but do note Dr Jones note 12/10/2020 stating that patient was no show for that visit. Also multiple notes indicating seroquel not refilled because no lipid panel available.    Is patient safe to drive:  No    Exam:    /75   Pulse 56   Temp 97.5  F (36.4  C) (Temporal)   Wt 139 lb 3.2 oz (63.1 kg)   BMI 23.89 kg/m       Wt Readings from Last 5 Encounters:   06/09/21 139 lb 3.2 oz (63.1 kg)   04/21/21 142 lb 3.2 oz (64.5 kg)   10/14/20 137 lb 12.8 oz (62.5 kg)   09/02/20 137 lb 12.8 oz (62.5 kg)   03/12/20 141 lb 3.2 oz (64 kg)     Speaks only Salvadorean and appears mildly to moderately impaired. Normal gait. Suspicious, depressed appearance. Visual fields full to threat. Extraocular movements intact without nystagmus. Inpaired  frontal saccades. Smile symmetrical. Tongue midline and strong. No drift, pronation, or tremor. Tone is normal. Reflexes symmetrical. Finger finger nose is done well.    Results for WOODY MORALES (MRN [id number]) as of 6/9/2021 17:27   Ref. Range 9/19/2018 14:59 10/14/2020 12:12 4/21/2021 13:30   Carbamazepine Level Latest Ref Range: 4.0 - 12.0 mg/L 12.3 (H) 14.2 (H) 11.8     CT scan of brain in ED was normal.    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 Per family had been under complete control since 2018 until three weeks ago when presented with three seizures. In contrast to ED note family states he was taking AED doses regularly; unfortunately I cannot find carbamazepine level from ED; this would have indicated whether he was compliant or not. If compliant and broke through then he is refractory to carbamazepine and needs another AED, if not can stick with carbamazepine. Three consecutive seizures is of concern.  2) Developmental delay, irritability. Normal MRI. Irritability; unclear if depression, ASD, perhaps some tendency toward paranoia. Difficult to assess through language barrier. Does not appear overtly psychotic. Psychiatric visit has not occurred.  3) Vitamin D deficiency; family has not pursued treatment as previously discussed.  4) Appears intolerant of levetiracetam.      PLAN:  1) Continue current carbamazepine.  2) start  mg twice a day. Risks of valproic acid including but are not limited to hair loss, weight gain, tremor, pancreatitis, thrombocytopenia and small risk of hepatitis which may be fatal but which is very rare in adults were discussed.  3) carbamazepine level, sodium today to confirm compliance. AST as baseline.  4) Call if any seizures. RTC in four months. If does well over long run would probably discontinue VPA.     Total time in person today 24 min. Additional 7 min reviewing chart prior to  visit. Additional 8 min generating note and coordinating care following visit. So total of 39 min, all on day of visit. Reviewed ED report and three additional results of studies and labs. Increased social challenges because of language barrier.    lCay Dyson MD

## 2021-06-09 NOTE — PATIENT INSTRUCTIONS
Call us if there are any new seizures.  Clinic number is [phone].    Keep taking tegretol XR (200 mg) two pills twice daily.  Take new medicine divalproex  mg one pill twice daily.    Return to clinic in 4 months.

## 2021-06-09 NOTE — LETTER
2021     RE: René Kumar  : 1995   MRN: [id number]      Dear Colleague,    Thank you for referring your patient, René Kumar, to the [facility] EPILEPSY CARE at [facility]. Please see a copy of my visit note below.    [facility] Epilepsy Care Progress Note      Patient:  René Kumar  :  1995   Age:  25 year old   Today's Office Visit:  2021    Epilepsy Data:    Patient History  Primary Epileptologist/Provider: Clay Dyson M.D.  Patient Status: Controlled  Epilepsy Syndrome: Localization-related epilepsy unspecified  Epilepsy Syndrome Status: Undetermined - Evaluation in Progress  Age of Onset: Age 10  Etiology  : Unknown  Other Relevant Dx/ Issues: suspected ASD     Tests/Surgery History  Last EE2018    Seizure Record  Current Visit Date: 21  Previous Visit Date: 21  Months since last visit: 1.61  Seizure Type 1: Tonic-clonic seizures  Description of Sz Type 1: family describes fall, stiffening and shaking. he is unresponsive with tongue bite and urinary incontinence.  # of Type 1 Seizure since last visit: 3  Freq. Type 1 / Month: 1.86    History of Present Illness:   Patient presents with brother. They speak no English. There were moderate difficulties getting  virtually and connection difficulties throughout the interaction. There were also some challenges getting focused responses from the family.    Patient's brother reports three seizures on 2021. They presented to ED. Brother today states that patient had been taking the carbamazepine ER regularly before they had the three seizures. This is different from ED statement but ED note does not acknowledge that patient was taking carbamazepine. Brother reports they were going to run out of the carbamazepine but they had not yet done so.    We asked brother to describe seizures and brother reported fall stiffening and  jerking with unresponsiveness.    Brother denies obvious seizure precipitants. Taking medications as directed. No fevers, diarrhea, vomiting. No new medications. No significant sleep deprivation, alcohol use, street drug use. No new significant stressors.    Levetiracetam was initiated in the ED but it caused sleeplessness and so patient stopped taking it and continued with carbamazepine at previous dose.      Current Outpatient Medications   Medication Sig Dispense Refill     carBAMazepine (TEGRETOL XR) 200 MG 12 hr tablet Take two pills twice daily (total 800 mg per day). 124 tablet 11     levETIRAcetam (KEPPRA) 500 MG tablet Take 1 tablet (500 mg) by mouth 2 times daily 60 tablet 0     QUEtiapine (SEROQUEL) 50 MG tablet TAKE 1 TABLET(50 MG) BY MOUTH EVERY NIGHT AS NEEDED 30 tablet 0        Medication Notes:    Not currently taking levetiracetam; they stopped taking it.  AED Medication Compliance:  compliant all of the time  Using a pill box:  No    Review of Systems:  No vomiting, diarrhea, fevers, hematuria or kidney stones.  Have you experienced a traumatic fall since your last visit: NO  Are these falls related to your seizures: Not Applicable    Other Issues:  Brother continues reporting that patient is irritable and gets angry at mother. No assaultive activity. We had asked psychiatry to consult; brother reports they called but stated that they could not help and that patient should see a neurologist for this matter. I cannot find documentation of this in chart but do note Dr Jones note 12/10/2020 stating that patient was no show for that visit. Also multiple notes indicating seroquel not refilled because no lipid panel available.    Is patient safe to drive:  No    Exam:    /75   Pulse 56   Temp 97.5  F (36.4  C) (Temporal)   Wt 139 lb 3.2 oz (63.1 kg)   BMI 23.89 kg/m       Wt Readings from Last 5 Encounters:   06/09/21 139 lb 3.2 oz (63.1 kg)   04/21/21 142 lb 3.2 oz (64.5 kg)   10/14/20 137  lb 12.8 oz (62.5 kg)   09/02/20 137 lb 12.8 oz (62.5 kg)   03/12/20 141 lb 3.2 oz (64 kg)     Speaks only Zambian and appears mildly to moderately impaired. Normal gait. Suspicious, depressed appearance. Visual fields full to threat. Extraocular movements intact without nystagmus. Inpaired frontal saccades. Smile symmetrical. Tongue midline and strong. No drift, pronation, or tremor. Tone is normal. Reflexes symmetrical. Finger finger nose is done well.    Results for WOODY MORALES (MRN [id number]) as of 6/9/2021 17:27   Ref. Range 9/19/2018 14:59 10/14/2020 12:12 4/21/2021 13:30   Carbamazepine Level Latest Ref Range: 4.0 - 12.0 mg/L 12.3 (H) 14.2 (H) 11.8     CT scan of brain in ED was normal.    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 Per family had been under complete control since 2018 until three weeks ago when presented with three seizures. In contrast to ED note family states he was taking AED doses regularly; unfortunately I cannot find carbamazepine level from ED; this would have indicated whether he was compliant or not. If compliant and broke through then he is refractory to carbamazepine and needs another AED, if not can stick with carbamazepine. Three consecutive seizures is of concern.  2) Developmental delay, irritability. Normal MRI. Irritability; unclear if depression, ASD, perhaps some tendency toward paranoia. Difficult to assess through language barrier. Does not appear overtly psychotic. Psychiatric visit has not occurred.  3) Vitamin D deficiency; family has not pursued treatment as previously discussed.  4) Appears intolerant of levetiracetam.      PLAN:  1) Continue current carbamazepine.  2) start  mg twice a day. Risks of valproic acid including but are not limited to hair loss, weight gain, tremor, pancreatitis, thrombocytopenia and small risk of hepatitis which may be fatal but which is very rare in  adults were discussed.  3) carbamazepine level, sodium today to confirm compliance. AST as baseline.  4) Call if any seizures. RTC in four months. If does well over long run would probably discontinue VPA.     Total time in person today 24 min. Additional 7 min reviewing chart prior to visit. Additional 8 min generating note and coordinating care following visit. So total of 39 min, all on day of visit. Reviewed ED report and three additional results of studies and labs. Increased social challenges because of language barrier.    Clay Dyson MD

## 2021-06-20 DIAGNOSIS — F84.0 AUTISM: ICD-10-CM

## 2021-06-20 NOTE — LETTER
June 28, 2021      René Kumar  [address] [place] [address] [address]  [place] [address]              Dear René,      Please schedule an appointment for a medication check prior to your next refill.    Sincerely,      Amy Parks CNP

## 2021-06-21 DIAGNOSIS — G40.909 SEIZURE DISORDER (H): ICD-10-CM

## 2021-06-23 RX ORDER — CARBAMAZEPINE 200 MG/1
TABLET, EXTENDED RELEASE ORAL
Qty: 124 TABLET | Refills: 11 | OUTPATIENT
Start: 2021-06-23

## 2021-06-23 RX ORDER — QUETIAPINE FUMARATE 50 MG/1
TABLET, FILM COATED ORAL
Qty: 30 TABLET | Refills: 0 | Status: SHIPPED | OUTPATIENT
Start: 2021-06-23 | End: 2021-07-22

## 2021-06-23 NOTE — TELEPHONE ENCOUNTER
Last OV with Alexandra Parks CNP on 10/14/20     Provider approval needed.     No results for input(s): CHOL, HDL, LDL, TRIG, CHOLHDLRATIO in the last 32918 hours.

## 2021-07-22 DIAGNOSIS — F84.0 AUTISM: ICD-10-CM

## 2021-07-22 RX ORDER — QUETIAPINE FUMARATE 50 MG/1
TABLET, FILM COATED ORAL
Qty: 30 TABLET | Refills: 0 | Status: SHIPPED | OUTPATIENT
Start: 2021-07-22 | End: 2021-08-19

## 2021-07-22 NOTE — TELEPHONE ENCOUNTER
Pending Prescriptions:                       Disp   Refills    QUEtiapine (SEROQUEL) 50 MG tablet [Pharma*30 tab*0        Sig: TAKE 1 TABLET(50 MG) BY MOUTH EVERY NIGHT AS NEEDED    Routing refill request to provider for review/approval because:  No results for input(s): CHOL, HDL, LDL, TRIG, CHOLHDLRATIO in the last 26119 hours.

## 2021-08-11 ENCOUNTER — OFFICE VISIT (OUTPATIENT)
Dept: INTERNAL MEDICINE | Facility: CLINIC | Age: 26
End: 2021-08-11
Payer: COMMERCIAL

## 2021-08-11 VITALS
HEIGHT: 64 IN | RESPIRATION RATE: 16 BRPM | DIASTOLIC BLOOD PRESSURE: 64 MMHG | OXYGEN SATURATION: 95 % | BODY MASS INDEX: 24.22 KG/M2 | HEART RATE: 71 BPM | WEIGHT: 141.9 LBS | TEMPERATURE: 98.3 F | SYSTOLIC BLOOD PRESSURE: 96 MMHG

## 2021-08-11 DIAGNOSIS — Z00.00 HEALTHCARE MAINTENANCE: Primary | ICD-10-CM

## 2021-08-11 LAB — HBA1C MFR BLD: 5.1 % (ref 0–5.6)

## 2021-08-11 PROCEDURE — 91301 PR COVID VAC MODERNA 100 MCG/0.5 ML IM: CPT | Performed by: NURSE PRACTITIONER

## 2021-08-11 PROCEDURE — 0011A PR COVID VAC MODERNA 100 MCG/0.5 ML IM: CPT | Performed by: NURSE PRACTITIONER

## 2021-08-11 PROCEDURE — 99395 PREV VISIT EST AGE 18-39: CPT | Mod: 25 | Performed by: NURSE PRACTITIONER

## 2021-08-11 PROCEDURE — 36415 COLL VENOUS BLD VENIPUNCTURE: CPT | Performed by: NURSE PRACTITIONER

## 2021-08-11 PROCEDURE — 83036 HEMOGLOBIN GLYCOSYLATED A1C: CPT | Performed by: NURSE PRACTITIONER

## 2021-08-11 PROCEDURE — 80061 LIPID PANEL: CPT | Performed by: NURSE PRACTITIONER

## 2021-08-11 PROCEDURE — 80048 BASIC METABOLIC PNL TOTAL CA: CPT | Performed by: NURSE PRACTITIONER

## 2021-08-11 ASSESSMENT — MIFFLIN-ST. JEOR: SCORE: 1549.65

## 2021-08-11 NOTE — PROGRESS NOTES
SUBJECTIVE:   CC: René Kumar is an 25 year old male who presents for preventative health visit.       Patient has been advised of split billing requirements and indicates understanding: Yes  HPI            Today's PHQ-2 Score:   PHQ-2 ( 1999 [employer]) 10/14/2020   Q1: Little interest or pleasure in doing things 0   Q2: Feeling down, depressed or hopeless 0   PHQ-2 Score 0   Q1: Little interest or pleasure in doing things -   Q2: Feeling down, depressed or hopeless -   PHQ-2 Score -       Abuse: Current or Past(Physical, Sexual or Emotional)- No  Do you feel safe in your environment? Yes    Have you ever done Advance Care Planning? (For example, a Health Directive, POLST, or a discussion with a medical provider or your loved ones about your wishes): No, advance care planning information given to patient to review.  Patient declined advance care planning discussion at this time.    Social History     Tobacco Use     Smoking status: Never Smoker     Smokeless tobacco: Never Used   Substance Use Topics     Alcohol use: No     If you drink alcohol do you typically have >3 drinks per day or >7 drinks per week? No    Alcohol Use 3/29/2018   Prescreen: >3 drinks/day or >7 drinks/week? Not Applicable       Last PSA: No results found for: PSA    Reviewed orders with patient. Reviewed health maintenance and updated orders accordingly - Yes  BP Readings from Last 3 Encounters:   08/11/21 96/64   06/09/21 112/75   05/24/21 96/68    Wt Readings from Last 3 Encounters:   08/11/21 64.4 kg (141 lb 14.4 oz)   06/09/21 63.1 kg (139 lb 3.2 oz)   04/21/21 64.5 kg (142 lb 3.2 oz)                  Patient Active Problem List   Diagnosis     Seizure (H)     Cognitive disorder     Partial symptomatic epilepsy with complex partial seizures, intractable, with status epilepticus (H)     Past Surgical History:   Procedure Laterality Date     NO HISTORY OF SURGERY         Social History     Tobacco Use     Smoking status: Never Smoker      "Smokeless tobacco: Never Used   Substance Use Topics     Alcohol use: No     History reviewed. No pertinent family history.      Current Outpatient Medications   Medication Sig Dispense Refill     carBAMazepine (TEGRETOL XR) 200 MG 12 hr tablet Take two pills twice daily (total 800 mg per day). 124 tablet 11     divalproex sodium delayed-release (DEPAKOTE) 250 MG DR tablet Take one by mouth twice daily 62 tablet 7     QUEtiapine (SEROQUEL) 50 MG tablet TAKE 1 TABLET(50 MG) BY MOUTH EVERY NIGHT AS NEEDED 30 tablet 0       Reviewed and updated as needed this visit by clinical staff  Tobacco  Allergies  Meds   Med Hx  Surg Hx  Fam Hx  Soc Hx        Reviewed and updated as needed this visit by Provider                    Review of Systems  CONSTITUTIONAL: NEGATIVE for fever, chills, change in weight  EYES: NEGATIVE for vision changes or irritation  ENT: NEGATIVE for ear, mouth and throat problems  RESP: NEGATIVE for significant cough or SOB  CV: NEGATIVE for chest pain, palpitations or peripheral edema  GI: NEGATIVE for nausea, abdominal pain, heartburn, or change in bowel habits   male: negative for dysuria, hematuria, decreased urinary stream, erectile dysfunction, urethral discharge  MUSCULOSKELETAL: NEGATIVE for significant arthralgias or myalgia  NEURO: NEGATIVE for weakness, dizziness or paresthesias  PSYCHIATRIC: NEGATIVE for changes in mood or affect    OBJECTIVE:   BP 96/64   Pulse 71   Temp 98.3  F (36.8  C) (Oral)   Resp 16   Ht 1.626 m (5' 4\")   Wt 64.4 kg (141 lb 14.4 oz)   SpO2 95%   BMI 24.36 kg/m      Physical Exam  GENERAL: healthy, alert and no distress  EYES: Eyes grossly normal to inspection, PERRL and conjunctivae and sclerae normal  NECK: no adenopathy, no asymmetry, masses, or scars and thyroid normal to palpation  RESP: lungs clear to auscultation - no rales, rhonchi or wheezes  CV: regular rate and rhythm, normal S1 S2, no S3 or S4, no murmur, click or rub, no peripheral edema and " "peripheral pulses strong  ABDOMEN: soft, nontender, no hepatosplenomegaly, no masses and bowel sounds normal  MS: no gross musculoskeletal defects noted, no edema  PSYCH: inattentive, affect flat and appearance well groomed        ASSESSMENT/PLAN:       ICD-10-CM    1. Healthcare maintenance  Z00.00 Basic metabolic panel  (Ca, Cl, CO2, Creat, Gluc, K, Na, BUN)     Hemoglobin A1c     Lipid panel reflex to direct LDL Non-fasting       Patient has been advised of split billing requirements and indicates understanding: Yes  COUNSELING:   Reviewed preventive health counseling, as reflected in patient instructions    Estimated body mass index is 24.36 kg/m  as calculated from the following:    Height as of this encounter: 1.626 m (5' 4\").    Weight as of this encounter: 64.4 kg (141 lb 14.4 oz).         He reports that he has never smoked. He has never used smokeless tobacco.      Counseling Resources:  ATP IV Guidelines  Pooled Cohorts Equation Calculator  FRAX Risk Assessment  ICSI Preventive Guidelines  Dietary Guidelines for Americans, 2010  USDA's MyPlate  ASA Prophylaxis  Lung CA Screening    Teagan Mckeon NP  [facility]  "

## 2021-08-11 NOTE — NURSING NOTE
"Physical  Vital signs:  Temp: 98.3  F (36.8  C) Temp src: Oral BP: 96/64 Pulse: 71   Resp: 16 SpO2: 95 %     Height: 162.6 cm (5' 4\") Weight: 64.4 kg (141 lb 14.4 oz)  Estimated body mass index is 24.36 kg/m  as calculated from the following:    Height as of this encounter: 1.626 m (5' 4\").    Weight as of this encounter: 64.4 kg (141 lb 14.4 oz).          "

## 2021-08-11 NOTE — LETTER
August 16, 2021      René Kumar  [address]  [place] [address]        Dear ,    We are writing to inform you of your test results.    Your cholesterol is borderline. I would like you to work harder on your diet for now. You will need a follow up fasting cholesterol in 12 months. The rest of your labs are normal range.        Resulted Orders   Basic metabolic panel  (Ca, Cl, CO2, Creat, Gluc, K, Na, BUN)   Result Value Ref Range    Sodium 140 133 - 144 mmol/L    Potassium 3.8 3.4 - 5.3 mmol/L    Chloride 108 94 - 109 mmol/L    Carbon Dioxide (CO2) 28 20 - 32 mmol/L    Anion Gap 4 3 - 14 mmol/L    Urea Nitrogen 4 (L) 7 - 30 mg/dL    Creatinine 0.88 0.66 - 1.25 mg/dL    Calcium 8.5 8.5 - 10.1 mg/dL    Glucose 95 70 - 99 mg/dL    GFR Estimate >90 >60 mL/min/1.73m2      Comment:      As of July 11, 2021, eGFR is calculated by the CKD-EPI creatinine equation, without race adjustment. eGFR can be influenced by muscle mass, exercise, and diet. The reported eGFR is an estimation only and is only applicable if the renal function is stable.   Hemoglobin A1c   Result Value Ref Range    Hemoglobin A1C 5.1 0.0 - 5.6 %      Comment:      Normal <5.7%   Prediabetes 5.7-6.4%    Diabetes 6.5% or higher     Note: Adopted from ADA consensus guidelines.   Lipid panel reflex to direct LDL Non-fasting   Result Value Ref Range    Cholesterol 132 <200 mg/dL      Comment:      Age 0-19 years  Desirable: <170 mg/dL  Borderline high:  170-199 mg/dl  High:            >199 mg/dl    Age 20 years and older  Desirable: <200 mg/dL    Triglycerides 157 (H) <150 mg/dL      Comment:      0-9 years:  Normal:    Less than 75 mg/dL  Borderline high:  75-99 mg/dL  High:             Greater than or equal to 100 mg/dL    0-19 years:  Normal:    Less than 90 mg/dL  Borderline high:   mg/dL  High:             Greater than or equal to 130 mg/dL    20 years and older:  Normal:    Less than 150 mg/dL  Borderline high:  150-199  mg/dL  High:             200-499 mg/dL  Very high:   Greater than or equal to 500 mg/dL    Direct Measure HDL 46 >=40 mg/dL      Comment:      0-19 years:       Greater than or equal to 45 mg/dL   Low: Less than 40 mg/dL   Borderline low: 40-44 mg/dL     20 years and older:   Female: Greater than or equal to 50 mg/dL   Male:   Greater than or equal to 40 mg/dL         LDL Cholesterol Calculated 55 <=100 mg/dL      Comment:      Age 0-19 years:  Desirable: 0-110 mg/dL   Borderline high: 110-129 mg/dL   High: >= 130 mg/dL    Age 20 years and older:  Desirable: <100mg/dL  Above desirable: 100-129 mg/dL   Borderline high: 130-159 mg/dL   High: 160-189 mg/dL   Very high: >= 190 mg/dL    Non HDL Cholesterol 86 <130 mg/dL      Comment:      0-19 years:  Desirable:          Less than 120 mg/dL  Borderline high:   120-144 mg/dL  High:                   Greater than or equal to 145 mg/dL    20 years and older:  Desirable:          130 mg/dL  Above Desirable: 130-159 mg/dL  Borderline high:   160-189 mg/dL  High:               190-219 mg/dL  Very high:     Greater than or equal to 220 mg/dL    Patient Fasting > 8hrs? No        If you have any questions or concerns, please call the clinic at the number listed above.       Sincerely,      Teagan Mckeon NP

## 2021-08-12 LAB
ANION GAP SERPL CALCULATED.3IONS-SCNC: 4 MMOL/L (ref 3–14)
BUN SERPL-MCNC: 4 MG/DL (ref 7–30)
CALCIUM SERPL-MCNC: 8.5 MG/DL (ref 8.5–10.1)
CHLORIDE BLD-SCNC: 108 MMOL/L (ref 94–109)
CHOLEST SERPL-MCNC: 132 MG/DL
CO2 SERPL-SCNC: 28 MMOL/L (ref 20–32)
CREAT SERPL-MCNC: 0.88 MG/DL (ref 0.66–1.25)
FASTING STATUS PATIENT QL REPORTED: NO
GFR SERPL CREATININE-BSD FRML MDRD: >90 ML/MIN/1.73M2
GLUCOSE BLD-MCNC: 95 MG/DL (ref 70–99)
HDLC SERPL-MCNC: 46 MG/DL
LDLC SERPL CALC-MCNC: 55 MG/DL
NONHDLC SERPL-MCNC: 86 MG/DL
POTASSIUM BLD-SCNC: 3.8 MMOL/L (ref 3.4–5.3)
SODIUM SERPL-SCNC: 140 MMOL/L (ref 133–144)
TRIGL SERPL-MCNC: 157 MG/DL

## 2021-08-19 DIAGNOSIS — F84.0 AUTISM: ICD-10-CM

## 2021-08-19 RX ORDER — QUETIAPINE FUMARATE 50 MG/1
TABLET, FILM COATED ORAL
Qty: 30 TABLET | Refills: 0 | Status: SHIPPED | OUTPATIENT
Start: 2021-08-19 | End: 2021-09-21

## 2021-08-19 NOTE — TELEPHONE ENCOUNTER
Pending Prescriptions:                       Disp   Refills    QUEtiapine (SEROQUEL) 50 MG tablet [Pharma*30 tab*0        Sig: TAKE 1 TABLET(50 MG) BY MOUTH EVERY NIGHT AS NEEDED    Routing refill request to provider for review/approval because:  LDL Cholesterol Calculated   Date Value Ref Range Status   08/11/2021 55 <=100 mg/dL Final     Comment:     Age 0-19 years:  Desirable: 0-110 mg/dL   Borderline high: 110-129 mg/dL   High: >= 130 mg/dL    Age 20 years and older:  Desirable: <100mg/dL  Above desirable: 100-129 mg/dL   Borderline high: 130-159 mg/dL   High: 160-189 mg/dL   Very high: >= 190 mg/dL

## 2021-09-08 ENCOUNTER — IMMUNIZATION (OUTPATIENT)
Dept: NURSING | Facility: CLINIC | Age: 26
End: 2021-09-08
Attending: INTERNAL MEDICINE
Payer: COMMERCIAL

## 2021-09-08 PROCEDURE — 91301 PR COVID VAC MODERNA 100 MCG/0.5 ML IM: CPT

## 2021-09-08 PROCEDURE — 0012A PR COVID VAC MODERNA 100 MCG/0.5 ML IM: CPT

## 2021-09-08 NOTE — NURSING NOTE
"Chief Complaint   Patient presents with     Allied Health Visit     2nd COVID injection     initial There were no vitals taken for this visit. Estimated body mass index is 24.36 kg/m  as calculated from the following:    Height as of 8/11/21: 1.626 m (5' 4\").    Weight as of 8/11/21: 64.4 kg (141 lb 14.4 oz)..  bp completed using cuff size NA (Not Taken)  LAURIE HAMILTON LPN  "

## 2021-11-04 ENCOUNTER — OFFICE VISIT (OUTPATIENT)
Dept: NEUROLOGY | Facility: CLINIC | Age: 26
End: 2021-11-04
Payer: COMMERCIAL

## 2021-11-04 VITALS
HEART RATE: 66 BPM | SYSTOLIC BLOOD PRESSURE: 113 MMHG | DIASTOLIC BLOOD PRESSURE: 71 MMHG | WEIGHT: 150.2 LBS | BODY MASS INDEX: 25.78 KG/M2 | TEMPERATURE: 97.1 F

## 2021-11-04 DIAGNOSIS — G40.211 PARTIAL SYMPTOMATIC EPILEPSY WITH COMPLEX PARTIAL SEIZURES, INTRACTABLE, WITH STATUS EPILEPTICUS (H): ICD-10-CM

## 2021-11-04 LAB
AST SERPL W P-5'-P-CCNC: 30 U/L (ref 0–45)
CARBAMAZEPINE SERPL-MCNC: 14.8 MG/L
SODIUM SERPL-SCNC: 139 MMOL/L (ref 133–144)
VALPROATE SERPL-MCNC: <3 MG/L

## 2021-11-04 RX ORDER — DIVALPROEX SODIUM 250 MG/1
TABLET, DELAYED RELEASE ORAL
Qty: 62 TABLET | Refills: 11 | Status: SHIPPED | OUTPATIENT
Start: 2021-11-04 | End: 2022-08-11

## 2021-11-04 NOTE — LETTER
11/4/2021       RE: René Kumar  [address] [place] [address]      Dear  or ,    We have seen Mr Kumar several times in our clinic. He suffers from seizures that are reasonably well controlled. He does not speak English. Mother reports that cannot read and is just learning the American and Japanese alphabets.    On examination he is oriented only to day. He has significant memory and and sequencing impairments. Tho formal examination in Japanese is not possible he appears to have at least moderate cognitive impairment.    I do not believe that Mr Kumar has the cognitive capacity to study meaningfully for a citizenship examination in either the Japanese or the English language. I do not believe that Mr Kumar could be meaningfully examined regarding citizenship issues in either English or Japanese.    Sincerely,    Clay Dyson MD

## 2021-11-04 NOTE — LETTER
2021     RE: René Kumar  : 1995   MRN: [id number]      Dear Colleague,    Thank you for referring your patient, René Kumar, to the [facility] EPILEPSY CARE at [facility]. Please see a copy of my visit note below.    [facility] Epilepsy Care Progress Note      Patient:  René Kumar  :  1995   Age:  25 year old   Today's Office Visit:  2021    Epilepsy Data:    Patient History  Primary Epileptologist/Provider: Clay Dyson M.D.  Patient Status: Controlled  Epilepsy Syndrome: Localization-related epilepsy unspecified  Epilepsy Syndrome Status: Undetermined - Evaluation in Progress  Age of Onset: Age 10  Etiology  : Unknown  Other Relevant Dx/ Issues: suspected ASD     Tests/Surgery History  Last EE2018    Seizure Record  Current Visit Date: 21  Previous Visit Date: 21  Months since last visit: 4.86  Seizure Type 1: Tonic-clonic seizures  Description of Sz Type 1: family describes fall, stiffening and shaking. he is unresponsive with tongue bite and urinary incontinence.  # of Type 1 Seizure since last visit: 0  Freq. Type 1 / Month: 0    Background History:  Stateless male seizure onset 9 yo. EEG with multifocal (Lt T, Rt T, Lt PS, Rt PS) epileptiform discharges. Severe epilepsy reportedly with daily convulsive seizures while in [place] but reportedly with complete control following [place] of carbamaz[place]. Mild to mod DE and poss ASD. CDI MRI normal with nl hippocampal volume. Breakthrough seizure May 2021, possibly related to noncompliance.    History of Present Illness:   It was not clear whether breakthrough seizures in May were related to noncompliance or not. Family reported that he was taking medicaitons and that he was about to run out.  Impression of ED was that he had run out. Unfortunately AED levels were not obtained. Levetiracetam was initiated but stopped  because it caused insomnia. We added low dose VPA last visit.     needed for today's visit. No seizures since last visit in June. We had started VPA at that time.    Quetiapine was initiated by outside provider since last visit.  Mom would like letter excusing him from having to take a citizenship examination. Mom reports that he has not had any education either in [place] or in the states.  Mom further states that he cannot read and has started to learn letters in English.      Current Outpatient Medications   Medication Sig Dispense Refill     carBAMazepine (TEGRETOL XR) 200 MG 12 hr tablet Take two pills twice daily (total 800 mg per day). 124 tablet 11     divalproex sodium delayed-release (DEPAKOTE) 250 MG DR tablet Take one by mouth twice daily 62 tablet 7     QUEtiapine (SEROQUEL) 50 MG tablet TAKE 1 TABLET(50 MG) BY MOUTH EVERY NIGHT AS NEEDED 30 tablet 4        Medication Notes:    Reviewed meds with mom and they are as above.  Seroquel was added since last visit.    AED Medication Compliance:  compliant all of the time  Using a pill box:  No    Review of Systems:  No vomiting, diarrhea, fevers, hematuria or kidney stones.  There has been mild weight gain as below.  Have you experienced a traumatic fall since your last visit: NO  Are these falls related to your seizures: Not Applicable    Other Issues:    No other health troubles. Behavior appears to have improved somewhat with quetiapine.  Is patient safe to drive:  No    Exam:    /71   Pulse 66   Temp 97.1  F (36.2  C) (Temporal)   Wt 150 lb 3.2 oz (68.1 kg)   BMI 25.78 kg/m       Wt Readings from Last 5 Encounters:   11/04/21 150 lb 3.2 oz (68.1 kg)   08/11/21 141 lb 14.4 oz (64.4 kg)   06/09/21 139 lb 3.2 oz (63.1 kg)   04/21/21 142 lb 3.2 oz (64.5 kg)   10/14/20 137 lb 12.8 oz (62.5 kg)     Dull appearing. Cooperative. Speaks in brief sentences per . Follows simple commands.  Alert. Oriented to day but not month or year,  "states \"I do not know about these things\". Cannot state name of president. Cannot tell me which state he resides in. When asked what country he resides in states \"they tell me I am in [place]\". We asked him to repeat three memory items which he was able to do. When asked to repeat a series of random numbers he states \"1, 2, 3...\". When asked to recall memory items, he starts counting and interposes some of the numbers he was asked to remember. Perseverates at other times.    Safe gait. EOMI. Smile symmetrical. No drift, pronation, or tremor. Tone is normal. FFN is done well. Heart exam without murmur; RRR.    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 Per family had been under complete control since 2018 until May when presented with three seizures. In contrast to ED note family states he was taking AED doses regularly; unfortunately I cannot find carbamazepine level from ED; this would have indicated whether he was compliant or not. Because of cluster we have added low dose VPA and seizures appear controlled over the last six months or so. Not clear he will need both AEDs indefinitely.  2) Developmental delay, irritability. Normal MRI. Irritability; unclear if depression, ASD, perhaps some tendency toward paranoia. Difficult to assess through language barrier. Does not appear overtly psychotic. Limited MSE through  today shows at least moderate level of developmental delay and I do not believe he can meaningfully study for to be examined for citizenship.  3) Vitamin D deficiency; family has not pursued treatment as previously discussed. We did not have time to address this further today.  4) Appears intolerant of levetiracetam.  5) Some weight gain; probably more related to quetiapine than VPA.     PLAN:  1) Continue current carbamazepine and VPA for now. Warned family about possibility of weight gain.  2) AED levels, sodium, AST  " today to confirm compliance, rule out toxicity.  3) Wrote letter regarding patient's current cognitive status.  4) Call if any seizures. RTC in four months. If does well through next visit, would probably discontinue VPA, especially if weight continues increasing.     Total time in person today 36 min including time generating letter. Additional 7 min reviewing chart prior to visit. Additional 8 min generating note and coordinating care following visit. So total of 51 min, all on day of visit. Increased social challenges because of language barrier.     Clay Dyson MD

## 2021-11-04 NOTE — PROGRESS NOTES
[facility] Epilepsy Care Progress Note      Patient:  René Kumar  :  1995   Age:  25 year old   Today's Office Visit:  2021    Epilepsy Data:    Patient History  Primary Epileptologist/Provider: Clay Dyson M.D.  Patient Status: Controlled  Epilepsy Syndrome: Localization-related epilepsy unspecified  Epilepsy Syndrome Status: Undetermined - Evaluation in Progress  Age of Onset: Age 10  Etiology  : Unknown  Other Relevant Dx/ Issues: suspected ASD     Tests/Surgery History  Last EE2018    Seizure Record  Current Visit Date: 21  Previous Visit Date: 21  Months since last visit: 4.86  Seizure Type 1: Tonic-clonic seizures  Description of Sz Type 1: family describes fall, stiffening and shaking. he is unresponsive with tongue bite and urinary incontinence.  # of Type 1 Seizure since last visit: 0  Freq. Type 1 / Month: 0    Background History:  [place] male seizure onset 11 yo. EEG with multifocal (Lt T, Rt T, Lt PS, Rt PS) epileptiform discharges. Severe epilepsy reportedly with daily convulsive seizures while in [facility] but reportedly with complete control following initiation of carbamazepine [place] the states. Mild to mod DE and poss ASD. CDI MRI normal with nl hippocampal volume. Breakthrough seizure May 2021, possibly related to noncompliance.    History of Present Illness:   It was not clear whether breakthrough seizures in May were related to noncompliance or not. Family reported that he was taking medicaitons and that he was about to run out.  Impression of ED was that he had run out. Unfortunately AED levels were not obtained. Levetiracetam was initiated but stopped because it caused insomnia. We added low dose VPA last visit.     needed for today's visit. No seizures since last visit in . We had started VPA at that time.    Quetiapine was initiated by outside provider since last visit.  Mom would like letter excusing him from having to  "take a citizenship examination. Mom reports that he has not had any education either in [place] or in the states.  Mom further states that he cannot read and has started to learn letters in English.      Current Outpatient Medications   Medication Sig Dispense Refill     carBAMazepine (TEGRETOL XR) 200 MG 12 hr tablet Take two pills twice daily (total 800 mg per day). 124 tablet 11     divalproex sodium delayed-release (DEPAKOTE) 250 MG DR tablet Take one by mouth twice daily 62 tablet 7     QUEtiapine (SEROQUEL) 50 MG tablet TAKE 1 TABLET(50 MG) BY MOUTH EVERY NIGHT AS NEEDED 30 tablet 4        Medication Notes:    Reviewed meds with mom and they are as above.  Seroquel was added since last visit.    AED Medication Compliance:  compliant all of the time  Using a pill box:  No    Review of Systems:  No vomiting, diarrhea, fevers, hematuria or kidney stones.  There has been mild weight gain as below.  Have you experienced a traumatic fall since your last visit: NO  Are these falls related to your seizures: Not Applicable    Other Issues:    No other health troubles. Behavior appears to have improved somewhat with quetiapine.  Is patient safe to drive:  No    Exam:    /71   Pulse 66   Temp 97.1  F (36.2  C) (Temporal)   Wt 150 lb 3.2 oz (68.1 kg)   BMI 25.78 kg/m       Wt Readings from Last 5 Encounters:   11/04/21 150 lb 3.2 oz (68.1 kg)   08/11/21 141 lb 14.4 oz (64.4 kg)   06/09/21 139 lb 3.2 oz (63.1 kg)   04/21/21 142 lb 3.2 oz (64.5 kg)   10/14/20 137 lb 12.8 oz (62.5 kg)     Dull appearing. Cooperative. Speaks in brief sentences per . Follows simple commands.  Alert. Oriented to day but not month or year, states \"I do not know about these things\". Cannot state name of president. Cannot tell me which state he resides in. When asked what country he resides in states \"they tell me I am in [place]\". We asked him to repeat three memory items which he was able to do. When asked to repeat a series " "of random numbers he states \"1, 2, 3...\". When asked to recall memory items, he starts counting and interposes some of the numbers he was asked to remember. Perseverates at other times.    Safe gait. EOMI. Smile symmetrical. No drift, pronation, or tremor. Tone is normal. FFN is done well. Heart exam without murmur; RRR.    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 Per family had been under complete control since 2018 until May when presented with three seizures. In contrast to ED note family states he was taking AED doses regularly; unfortunately I cannot find carbamazepine level from ED; this would have indicated whether he was compliant or not. Because of cluster we have added low dose VPA and seizures appear controlled over the last six months or so. Not clear he will need both AEDs indefinitely.  2) Developmental delay, irritability. Normal MRI. Irritability; unclear if depression, ASD, perhaps some tendency toward paranoia. Difficult to assess through language barrier. Does not appear overtly psychotic. Limited MSE through  today shows at least moderate level of developmental delay and I do not believe he can meaningfully study for to be examined for citizenship.  3) Vitamin D deficiency; family has not pursued treatment as previously discussed. We did not have time to address this further today.  4) Appears intolerant of levetiracetam.  5) Some weight gain; probably more related to quetiapine than VPA.     PLAN:  1) Continue current carbamazepine and VPA for now. Warned family about possibility of weight gain.  2) AED levels, sodium, AST  today to confirm compliance, rule out toxicity.  3) Wrote letter regarding patient's current cognitive status.  4) Call if any seizures. RTC in four months. If does well through next visit, would probably discontinue VPA, especially if weight continues increasing.     Total time in person " today 36 min including time generating letter. Additional 7 min reviewing chart prior to visit. Additional 8 min generating note and coordinating care following visit. So total of 51 min, all on day of visit. Increased social challenges because of language barrier.     Clay Dyson MD

## 2022-01-25 ENCOUNTER — TELEPHONE (OUTPATIENT)
Dept: NEUROLOGY | Facility: CLINIC | Age: 27
End: 2022-01-25
Payer: COMMERCIAL

## 2022-01-25 NOTE — TELEPHONE ENCOUNTER
Called to reschedule appt w Dr. Dyson, but pt's phone number has been changed or disconnected per message. Cancelled appt and sent letter to pt's address on file.

## 2022-07-05 ENCOUNTER — HOSPITAL ENCOUNTER (EMERGENCY)
Facility: CLINIC | Age: 27
Discharge: HOME | End: 2022-07-05
Attending: EMERGENCY MEDICINE | Admitting: EMERGENCY MEDICINE
Payer: COMMERCIAL

## 2022-07-05 VITALS
SYSTOLIC BLOOD PRESSURE: 114 MMHG | DIASTOLIC BLOOD PRESSURE: 85 MMHG | HEART RATE: 62 BPM | OXYGEN SATURATION: 100 % | TEMPERATURE: 98.2 F | RESPIRATION RATE: 16 BRPM

## 2022-07-05 DIAGNOSIS — Z91.148 NONCOMPLIANCE WITH MEDICATION REGIMEN: ICD-10-CM

## 2022-07-05 DIAGNOSIS — G40.909 SEIZURE DISORDER (H): ICD-10-CM

## 2022-07-05 LAB
ANION GAP SERPL CALCULATED.3IONS-SCNC: 6 MMOL/L (ref 3–14)
BASOPHILS # BLD AUTO: 0 10E3/UL (ref 0–0.2)
BASOPHILS NFR BLD AUTO: 0 %
BUN SERPL-MCNC: 11 MG/DL (ref 7–30)
CALCIUM SERPL-MCNC: 8.5 MG/DL (ref 8.5–10.1)
CARBAMAZEPINE SERPL-MCNC: 3 MG/L
CHLORIDE BLD-SCNC: 107 MMOL/L (ref 94–109)
CO2 SERPL-SCNC: 27 MMOL/L (ref 20–32)
CREAT SERPL-MCNC: 0.95 MG/DL (ref 0.66–1.25)
EOSINOPHIL # BLD AUTO: 0.1 10E3/UL (ref 0–0.7)
EOSINOPHIL NFR BLD AUTO: 1 %
ERYTHROCYTE [DISTWIDTH] IN BLOOD BY AUTOMATED COUNT: 12.2 % (ref 10–15)
GFR SERPL CREATININE-BSD FRML MDRD: >90 ML/MIN/1.73M2
GLUCOSE BLD-MCNC: 113 MG/DL (ref 70–99)
HCT VFR BLD AUTO: 42.8 % (ref 40–53)
HGB BLD-MCNC: 14.3 G/DL (ref 13.3–17.7)
HOLD SPECIMEN: NORMAL
HOLD SPECIMEN: NORMAL
IMM GRANULOCYTES # BLD: 0 10E3/UL
IMM GRANULOCYTES NFR BLD: 1 %
LYMPHOCYTES # BLD AUTO: 1.5 10E3/UL (ref 0.8–5.3)
LYMPHOCYTES NFR BLD AUTO: 34 %
MCH RBC QN AUTO: 30.2 PG (ref 26.5–33)
MCHC RBC AUTO-ENTMCNC: 33.4 G/DL (ref 31.5–36.5)
MCV RBC AUTO: 90 FL (ref 78–100)
MONOCYTES # BLD AUTO: 0.5 10E3/UL (ref 0–1.3)
MONOCYTES NFR BLD AUTO: 12 %
NEUTROPHILS # BLD AUTO: 2.3 10E3/UL (ref 1.6–8.3)
NEUTROPHILS NFR BLD AUTO: 52 %
NRBC # BLD AUTO: 0 10E3/UL
NRBC BLD AUTO-RTO: 0 /100
PLATELET # BLD AUTO: 170 10E3/UL (ref 150–450)
POTASSIUM BLD-SCNC: 3.7 MMOL/L (ref 3.4–5.3)
RBC # BLD AUTO: 4.74 10E6/UL (ref 4.4–5.9)
SODIUM SERPL-SCNC: 140 MMOL/L (ref 133–144)
VALPROATE SERPL-MCNC: <3 MG/L
WBC # BLD AUTO: 4.5 10E3/UL (ref 4–11)

## 2022-07-05 PROCEDURE — 36415 COLL VENOUS BLD VENIPUNCTURE: CPT | Performed by: EMERGENCY MEDICINE

## 2022-07-05 PROCEDURE — 85025 COMPLETE CBC W/AUTO DIFF WBC: CPT | Performed by: EMERGENCY MEDICINE

## 2022-07-05 PROCEDURE — 80164 ASSAY DIPROPYLACETIC ACD TOT: CPT | Performed by: EMERGENCY MEDICINE

## 2022-07-05 PROCEDURE — 80048 BASIC METABOLIC PNL TOTAL CA: CPT | Performed by: EMERGENCY MEDICINE

## 2022-07-05 PROCEDURE — 258N000003 HC RX IP 258 OP 636: Performed by: EMERGENCY MEDICINE

## 2022-07-05 PROCEDURE — 99284 EMERGENCY DEPT VISIT MOD MDM: CPT | Mod: 25

## 2022-07-05 PROCEDURE — 80156 ASSAY CARBAMAZEPINE TOTAL: CPT | Performed by: EMERGENCY MEDICINE

## 2022-07-05 PROCEDURE — 96365 THER/PROPH/DIAG IV INF INIT: CPT

## 2022-07-05 PROCEDURE — 250N000009 HC RX 250: Performed by: EMERGENCY MEDICINE

## 2022-07-05 PROCEDURE — 250N000013 HC RX MED GY IP 250 OP 250 PS 637: Performed by: EMERGENCY MEDICINE

## 2022-07-05 RX ORDER — CARBAMAZEPINE 200 MG/1
400 TABLET, EXTENDED RELEASE ORAL 2 TIMES DAILY
Qty: 124 TABLET | Refills: 0 | Status: SHIPPED | OUTPATIENT
Start: 2022-07-05 | End: 2023-04-07

## 2022-07-05 RX ORDER — DIVALPROEX SODIUM 250 MG/1
250 TABLET, DELAYED RELEASE ORAL 2 TIMES DAILY
Qty: 62 TABLET | Refills: 0 | Status: SHIPPED | OUTPATIENT
Start: 2022-07-05 | End: 2023-02-08

## 2022-07-05 RX ORDER — CARBAMAZEPINE 200 MG/1
400 TABLET, EXTENDED RELEASE ORAL ONCE
Status: COMPLETED | OUTPATIENT
Start: 2022-07-05 | End: 2022-07-05

## 2022-07-05 RX ADMIN — VALPROATE SODIUM 500 MG: 100 INJECTION, SOLUTION INTRAVENOUS at 10:27

## 2022-07-05 RX ADMIN — CARBAMAZEPINE 400 MG: 200 TABLET, EXTENDED RELEASE ORAL at 10:23

## 2022-07-05 ASSESSMENT — ENCOUNTER SYMPTOMS
COUGH: 0
FEVER: 0
HEADACHES: 1
SEIZURES: 1
VOMITING: 0
ABDOMINAL PAIN: 0

## 2022-07-05 NOTE — RESULT ENCOUNTER NOTE
"Per ED Provider note:  \"...noncompliant with medication regimen for the last few days due to unable to obtain refill...Patient was loaded with Depakote in the emergency department and prescriptions were provided for refills of his medications.\"    Routed to Neurologist, Dr Dyson"

## 2022-07-05 NOTE — ED PROVIDER NOTES
History   Chief Complaint:  Seizures     The history is provided by the patient and a parent. The history is limited by a language barrier. A  was used (Professional).      René Kumar is a 26 year old male with history of partial symptomatic epilepsy who presents with seizure. Patient's mother reports that this morning approximately between 8260-3825, the patient was sleeping when he began exhibiting seizure activity. States that he fell from his bed and patient reports he is having a headache. Mother notes that the patient has a history of seizures and has been out of his carbamazepine for 3-4 days. He has been unsuccessful in getting a refill. Mother adds that the patient is still not at his baseline. No recent fever, cough, vomiting, chest pain or abdominal pain.    Review of Systems   Constitutional: Negative for fever.   Respiratory: Negative for cough.    Cardiovascular: Negative for chest pain.   Gastrointestinal: Negative for abdominal pain and vomiting.   Neurological: Positive for seizures and headaches.   All other systems reviewed and are negative.    Allergies:  No Known Allergies    Medications:  Carbamazepine - been out of for 3-4 days  Quetiapine  Divalproex sodium delayed-release - had been out of     Past Medical History:     Seizures - partial symptomatic epilepsy  Cognitive disorder    Social History:  Presents via EMS  Presents alone  Mother later presents    Physical Exam     Patient Vitals for the past 24 hrs:   BP Temp Temp src Pulse Resp SpO2   07/05/22 1130 (!) 121/90 -- -- 60 -- 99 %   07/05/22 1115 -- -- -- -- -- 100 %   07/05/22 1100 120/83 -- -- 61 -- 100 %   07/05/22 1045 -- -- -- -- -- 99 %   07/05/22 1030 115/84 -- -- 62 -- 100 %   07/05/22 1000 107/79 -- -- 60 -- 100 %   07/05/22 0930 109/76 -- -- 65 -- 98 %   07/05/22 0908 -- -- -- -- -- 100 %   07/05/22 0906 -- -- -- -- -- 99 %   07/05/22 0905 113/78 -- -- 66 -- --   07/05/22 0900 113/78 -- -- -- --  97 %   22 0851 118/80 98.2  F (36.8  C) Oral 68 16 99 %       Physical Exam  Constitutional: Well developed, nontox appearance  Head: Atraumatic.   Mouth/Throat: Oropharynx is clear and moist.   Neck:  no stridor, no c spine tenderness  Eyes: no scleral icterus, PERRL, EOMI  Cardiovascular: RRR, 2+ bilat radial pulses  Pulmonary/Chest: nml resp effort, Clear BS bilat  Abdominal: ND, soft, NT, no rebound or guarding   Ext: Warm, well perfused, no edema  Neurological: A&O,  Symmetric facies,  5/5 strength throughout upper and lower ext, symmetric;  Skin: Skin is warm and dry.   Psychiatric: Behavior is normal. Thought content normal.   Nursing note and vitals reviewed.    Emergency Department Course   Laboratory:  Labs Ordered and Resulted from Time of ED Arrival to Time of ED Departure   CBC WITH PLATELETS AND DIFFERENTIAL       Result Value    WBC Count 4.5      RBC Count 4.74      Hemoglobin 14.3      Hematocrit 42.8      MCV 90      MCH 30.2      MCHC 33.4      RDW 12.2      Platelet Count 170      % Neutrophils 52      % Lymphocytes 34      % Monocytes 12      % Eosinophils 1      % Basophils 0      % Immature Granulocytes 1      NRBCs per 100 WBC 0      Absolute Neutrophils 2.3      Absolute Lymphocytes 1.5      Absolute Monocytes 0.5      Absolute Eosinophils 0.1      Absolute Basophils 0.0      Absolute Immature Granulocytes 0.0      Absolute NRBCs 0.0     VALPROIC ACID   BASIC METABOLIC PANEL   CARBAMAZEPINE TOTAL      Procedures    Emergency Department Course:     Reviewed:  I reviewed nursing notes, vitals, past medical history and Care Everywhere    Assessments:  0905 I obtained history and examined the patient as noted above.   1142 I rechecked the patient and explained findings. Amenable to discharge.     Interventions:  1023 Carbamazepine, 400 mg, PO  1027 Valproate, 500 mg, IV    Disposition:  The patient was discharged to home.     Impression & Plan   Medical Decision Makin year old male  presenting w/ presenting seizure, noncompliant with medication regimen for the last few days due to unable to obtain refill.     Patient presentation is consistent with a seizure likely secondary to being off his meds for the last 3 days. Screening labs ordered as noted above as well as seizure med levels. Patient was loaded with Depakote in the emergency department and prescriptions were provided for refills of his medications. Doubt underlying infection, intracranial abnormality or significant trauma given history and physical exam. At this time I feel the pt is safe for discharge.  Recommendations given regarding follow up with neurology and return to the emergency department as needed for new or worsening symptoms. Pt counseled on all results, disposition and diagnosis. They are understanding and agreeable to plan. Patient discharged in stable condition.      Diagnosis:    ICD-10-CM    1. Noncompliance with medication regimen  Z91.14    2. Seizure disorder (H)  G40.909        Discharge Medications:  New Prescriptions    CARBAMAZEPINE (TEGRETOL XR) 200 MG 12 HR TABLET    Take 2 tablets (400 mg) by mouth 2 times daily for 31 days    DIVALPROEX SODIUM DELAYED-RELEASE (DEPAKOTE) 250 MG DR TABLET    Take 1 tablet (250 mg) by mouth 2 times daily for 31 days       Scribe Disclosure:  Marco DA SILVA, am serving as a scribe at 8:58 AM on 7/5/2022 to document services personally performed by Davonte Yang MD based on my observations and the provider's statements to me.          Davonte Yang MD  07/05/22 3799

## 2022-07-05 NOTE — ED TRIAGE NOTES
Pt arrives via EMS for seizure activity this AM, pt known hx of seizures out of medications x4 days. EMS reports pt siting on bed post ictal able to get himself up. Family wanted pt evaluated and medications refilled. VSS.   18 G L hand. Pt [place] speaking, alert to person and place for EMS. Waiting for family to arrive.      Triage Assessment     Row Name 07/05/22 0852       Triage Assessment (Adult)    Airway WDL WDL       Respiratory WDL    Respiratory WDL WDL       Skin Circulation/Temperature WDL    Skin Circulation/Temperature WDL WDL       Cardiac WDL    Cardiac WDL WDL       Peripheral/Neurovascular WDL    Peripheral Neurovascular WDL WDL       Cognitive/Neuro/Behavioral WDL    Cognitive/Neuro/Behavioral WDL X  pt postictal for EMS, alert to person and place.        [place] Coma Scale    Best Eye Response 4-->(E4) spontaneous    Best Motor Response 6-->(M6) obeys commands    Best Verbal Response 4-->(V4) confused    [place] Coma Scale Score 14

## 2022-07-05 NOTE — DISCHARGE INSTRUCTIONS
Discharge Instructions  Recurrent Seizure (Convulsion)    You were seen today for a seizure. The most common reason for a recurrent seizure is having missed a dose of your medication or taken it at a different time than normal. Other things that increase the risk of seizures include fever, sleep deprivation, alcohol, and stress. Although anti-seizure medications (anti-epileptic drugs) work for many people with seizure disorders, some people continue to have seizures even after trying several medications.    Generally, every Emergency Department visit should have a follow-up clinic visit with either a primary or a specialty clinic/provider. Please follow-up as instructed by your emergency provider today.    Return to the Emergency Department if:   You develop a fever over 100.4 F.  You feel much more ill, or develop new symptoms like severe headache.  You have trouble walking, seeing, or develop weakness or numbness in your arms or legs.     What can I do to help myself?  Take your medication exactly as directed, at the right times, and the right doses.   If you develop uncomfortable side effects, do not stop taking your anti-seizure medication without first speaking to your provider.   Do not let your prescription run out. Stopping anti-seizure medication abruptly can put you at risk of a seizure.  While taking an anti-seizure medication, do not start taking any other medications including over-the-counter medications and herbal supplements without first checking with your provider because mixing them can be dangerous.  Do not drive until you have been rechecked by your provider and have been told it is safe to drive.  If you have a seizure while driving you may cause a motor vehicle accident with injury or death to yourself or others.   Do not swim, climb ladders, or do anything else that would be dangerous if you had another seizure or spell of loss of consciousness, until you are cleared by your provider.     Check your state driving requirements for patients with seizures on the Epilepsy Foundation Website at www.epilepsyfoundation.org/resources/drivingandtravel.cfm.  Do not drink alcohol.  Drinking alcohol increases the risk of seizures and can interfere with the effect of anti-seizure medications.  Start a seizure calendar to record any seizure triggers, such as days when you were sleep-deprived, stressed, drank alcohol, or (if you are a woman) had your period.  Remember, if one medication does not work for you, either because you cannot tolerate the side effects or because you continue to have seizures, your provider can suggest alternate medications or alternate methods of taking the medication.  If you were given a prescription for medicine here today, be sure to read all of the information (including the package insert) that comes with your prescription.  This will include important information about the medicine, its side effects, and any warnings that you need to know about.  The pharmacist who fills the prescription can provide more information and answer questions you may have about the medicine.  If you have questions or concerns that the pharmacist cannot address, please call or return to the Emergency Department.   Remember that you can always come back to the Emergency Department if you are not able to see your regular provider in the amount of time listed above, if you get any new symptoms, or if there is anything that worries you.

## 2022-07-28 DIAGNOSIS — F84.0 AUTISM: ICD-10-CM

## 2022-07-29 NOTE — TELEPHONE ENCOUNTER
Routing refill request to provider for review/approval because:  Drug interaction warning with carbamazepine.     Carla Rosa RN  [facility]

## 2022-08-01 RX ORDER — QUETIAPINE FUMARATE 50 MG/1
TABLET, FILM COATED ORAL
Qty: 30 TABLET | Refills: 0 | Status: SHIPPED | OUTPATIENT
Start: 2022-08-01 | End: 2022-10-20

## 2022-08-11 ENCOUNTER — OFFICE VISIT (OUTPATIENT)
Dept: NEUROLOGY | Facility: CLINIC | Age: 27
End: 2022-08-11
Payer: COMMERCIAL

## 2022-08-11 VITALS
HEIGHT: 60 IN | DIASTOLIC BLOOD PRESSURE: 72 MMHG | HEART RATE: 64 BPM | BODY MASS INDEX: 28.11 KG/M2 | SYSTOLIC BLOOD PRESSURE: 108 MMHG | WEIGHT: 143.2 LBS | TEMPERATURE: 97.2 F

## 2022-08-11 DIAGNOSIS — G40.211 PARTIAL SYMPTOMATIC EPILEPSY WITH COMPLEX PARTIAL SEIZURES, INTRACTABLE, WITH STATUS EPILEPTICUS (H): ICD-10-CM

## 2022-08-11 DIAGNOSIS — F99 INSOMNIA DUE TO OTHER MENTAL DISORDER: Primary | ICD-10-CM

## 2022-08-11 DIAGNOSIS — F51.05 INSOMNIA DUE TO OTHER MENTAL DISORDER: Primary | ICD-10-CM

## 2022-08-11 DIAGNOSIS — G40.909 SEIZURE DISORDER (H): ICD-10-CM

## 2022-08-11 RX ORDER — CARBAMAZEPINE 200 MG/1
TABLET, EXTENDED RELEASE ORAL
Qty: 124 TABLET | Refills: 11 | Status: SHIPPED | OUTPATIENT
Start: 2022-08-11 | End: 2023-02-08

## 2022-08-11 RX ORDER — TRAZODONE HYDROCHLORIDE 50 MG/1
50 TABLET, FILM COATED ORAL AT BEDTIME
Qty: 31 TABLET | Refills: 5 | Status: SHIPPED | OUTPATIENT
Start: 2022-08-11 | End: 2023-01-17

## 2022-08-11 RX ORDER — DIVALPROEX SODIUM 250 MG/1
TABLET, DELAYED RELEASE ORAL
Qty: 62 TABLET | Refills: 11 | Status: SHIPPED | OUTPATIENT
Start: 2022-08-11 | End: 2023-04-07

## 2022-08-11 ASSESSMENT — PAIN SCALES - GENERAL: PAINLEVEL: NO PAIN (0)

## 2022-08-11 NOTE — PROGRESS NOTES
[facility] Epilepsy Care Progress Note      Patient:  René Kumar  :  1995   Age:  26 year old   Today's Office Visit:  2022    Epilepsy Data:    Patient History  Primary Epileptologist/Provider: Clay Dyson M.D.  Patient Status: Controlled  Epilepsy Syndrome: Localization-related epilepsy unspecified  Epilepsy Syndrome Status: Undetermined - Evaluation in Progress  Age of Onset: Age 10  Etiology  : Unknown  Other Relevant Dx/ Issues: suspected ASD     Tests/Surgery History  Last EE2018    Seizure Record  Current Visit Date: 22  Previous Visit Date: 21  Months since last visit: 9.2  Seizure Type 1: Tonic-clonic seizures  Description of Sz Type 1: family describes fall, stiffening and shaking. he is unresponsive with tongue bite and urinary incontinence.  # of Type 1 Seizure since last visit: 1  Freq. Type 1 / Month: 0.11    Background History:  Peruvian male seizure onset 11 yo. EEG with multifocal (Lt T, Rt T, Lt PS, Rt PS) epileptiform discharges. Severe epilepsy reportedly with daily convulsive seizures while in [facility] but reportedly with complete control [place] Mild to mod DE and poss ASD. CDI MRI normal with nl hippocampal volume. Breakthrough seizure May 2021, possibly related to noncompliance.    History of Present Illness:     One seizure since last seen in early July. He ran out of carbamazepine for three days and had a convulsive seizure.  Family reports stiffening and jerking and falling out of bed. Unresponsive. Five to ten minutes to recover.   Seen in ED where loaded with VPA. Carbamazepine level was 3.0. Carbamazepine and VPA was resumed.   Presents to clinic today reporting that Rx ran out; last pill last night; he has not missed his pills this morning.      Current Outpatient Medications   Medication Sig Dispense Refill     QUEtiapine (SEROQUEL) 50 MG tablet TAKE 1 TABLET(50 MG) BY MOUTH EVERY  NIGHT AS NEEDED 30 tablet 0     carBAMazepine (TEGRETOL XR) 200 MG 12 hr tablet Take 2 tablets (400 mg) by mouth 2 times daily for 31 days 124 tablet 0     carBAMazepine (TEGRETOL XR) 200 MG 12 hr tablet Take two pills twice daily (total 800 mg per day). 124 tablet 11     divalproex sodium delayed-release (DEPAKOTE) 250 MG DR tablet Take 1 tablet (250 mg) by mouth 2 times daily for 31 days 62 tablet 0     divalproex sodium delayed-release (DEPAKOTE) 250 MG DR tablet Take one by mouth twice daily 62 tablet 11        Medication Notes:  AED doses as above.      AED Medication Compliance:  noncompliant some of the time (see above)  Using a pill box:  No    Review of Systems:  No vomiting, diarrhea, fevers, hematuria or kidney stones.  Have you experienced a traumatic fall since your last visit: YES  Are these falls related to your seizures: YES: fell out of bed with seizure.    Other Issues:  Continues complaining of insomnia. Family reports that quetiapine is not effective.  Sleep schedule 10 PM to 2 AM, up until around 8 AM, then sleeps 8 AM to 10 AM.   Is patient safe to drive:  No    Exam:    /72 (BP Location: Right arm, Patient Position: Sitting, Cuff Size: Adult Regular)   Pulse 64   Temp 97.2  F (36.2  C) (Temporal)   Ht 5' (152.4 cm)   Wt 143 lb 3.2 oz (65 kg)   BMI 27.97 kg/m       Wt Readings from Last 5 Encounters:   08/11/22 143 lb 3.2 oz (65 kg)   11/04/21 150 lb 3.2 oz (68.1 kg)   08/11/21 141 lb 14.4 oz (64.4 kg)   06/09/21 139 lb 3.2 oz (63.1 kg)   04/21/21 142 lb 3.2 oz (64.5 kg)      Alert. Follows simple commands. Safe gait. EOMI. Smile symmetrical. No drift, pronation, or tremor. Tone is normal. FFN is done well. Heart exam without murmur; RRR.     Latest Reference Range & Units 11/04/21 16:22 07/05/22 08:57   Carbamazepine Level   mg/L 14.8 3.0   Valproic Acid Level   mg/L <3 <3      Latest Reference Range & Units 07/05/22 11:15   Sodium 133 - 144 mmol/L 140       IMPRESSION  1) Focal  epilepsy; multifocal epileptiform discharges on EEG; left posterior hemisphere best developed. Per family had been under complete control since 2018 until May 2021 when presented with three seizures, apparently in setting of noncompliance. Now with another seizure in early July, again when ran out of AEDs. Seizure occurred through carbamazepine level of 3.0. Missed dose this morning, again because ran out of meds.  2) Developmental delay, irritability. Normal MRI. Irritability; unclear if depression, ASD, perhaps some tendency toward paranoia. Difficult to assess through language barrier. Does not appear overtly psychotic. Limited MSE through  today shows at least moderate level of developmental delay and I do not believe he can meaningfully study for to be examined for citizenship.  3) Vitamin D deficiency; family has not pursued treatment as previously discussed. We did not have time to address this further today.  4) Appears intolerant of levetiracetam.  5) Sleep maintenance insomnia, unresponsive to quetiapine.     PLAN:  1) Resume carbamazepine 400 mg twice a day.  mg tonite, then resume usual 250 mg twice a day. Precise regimen written out and discussed repeatedly with mother and .  2) Refilled AEDs for one year.  3) Discussed importance of compliance. Provided with clinic number.  4) Call if any seizures. RTC in six months. If does well through next visit, would probably discontinue VPA; he probably does not require two AEDs.     Total time in person today 34 min. Additional 7 min reviewing chart prior to visit. Reviewed ED notes, three additional labs. Discussed with mother who provided unique information. Significant psychosocial obstacles including language, understanding of medical system, refill authorizations, etc. Total 41 min on day of visit. Note written on subsequent days.     Clay Dyson MD

## 2022-08-11 NOTE — LETTER
2022       RE: René Kumar  : 1995   MRN: [id number]      Dear Colleague,    Thank you for referring your patient, René Kumar, to the [facility] EPILEPSY CARE at [facility]. Please see a copy of my visit note below.    [facility] Epilepsy Care Progress Note      Patient:  René Kumar  :  1995   Age:  26 year old   Today's Office Visit:  2022    Epilepsy Data:    Patient History  Primary Epileptologist/Provider: Clay Dyson M.D.  Patient Status: Controlled  Epilepsy Syndrome: Localization-related epilepsy unspecified  Epilepsy Syndrome Status: Undetermined - Evaluation in Progress  Age of Onset: Age 10  Etiology  : Unknown  Other Relevant Dx/ Issues: suspected ASD     Tests/Surgery History  Last EE2018    Seizure Record  Current Visit Date: 22  Previous Visit Date: 21  Months since last visit: 9.2  Seizure Type 1: Tonic-clonic seizures  Description of Sz Type 1: family describes fall, stiffening and shaking. he is unresponsive with tongue bite and urinary incontinence.  # of Type 1 Seizure since last visit: 1  Freq. Type 1 / Month: 0.11    Background History:  Swedish male seizure onset 11 yo. EEG with multifocal (Lt T, Rt T, Lt PS, Rt PS) epileptiform discharges. Severe epilepsy reportedly with daily convulsive seizures while in [place] but reportedly with complete control following [place]. Mild to mod DE and poss ASD. CDI MRI normal with nl hippocampal volume. Breakthrough seizure May 2021, possibly related to noncompliance.    History of Present Illness:     One seizure since last seen in early July. He ran out of carbamazepine for three days and had a convulsive seizure.  Family reports stiffening and jerking and falling out of bed. Unresponsive. Five to ten minutes to recover.   Seen in ED where loaded with VPA. Carbamazepine level was 3.0.  Carbamazepine and VPA was resumed.   Presents to clinic today reporting that Rx ran out; last pill last night; he has not missed his pills this morning.      Current Outpatient Medications   Medication Sig Dispense Refill     QUEtiapine (SEROQUEL) 50 MG tablet TAKE 1 TABLET(50 MG) BY MOUTH EVERY NIGHT AS NEEDED 30 tablet 0     carBAMazepine (TEGRETOL XR) 200 MG 12 hr tablet Take 2 tablets (400 mg) by mouth 2 times daily for 31 days 124 tablet 0     carBAMazepine (TEGRETOL XR) 200 MG 12 hr tablet Take two pills twice daily (total 800 mg per day). 124 tablet 11     divalproex sodium delayed-release (DEPAKOTE) 250 MG DR tablet Take 1 tablet (250 mg) by mouth 2 times daily for 31 days 62 tablet 0     divalproex sodium delayed-release (DEPAKOTE) 250 MG DR tablet Take one by mouth twice daily 62 tablet 11        Medication Notes:  AED doses as above.      AED Medication Compliance:  noncompliant some of the time (see above)  Using a pill box:  No    Review of Systems:  No vomiting, diarrhea, fevers, hematuria or kidney stones.  Have you experienced a traumatic fall since your last visit: YES  Are these falls related to your seizures: YES: fell out of bed with seizure.    Other Issues:  Continues complaining of insomnia. Family reports that quetiapine is not effective.  Sleep schedule 10 PM to 2 AM, up until around 8 AM, then sleeps 8 AM to 10 AM.   Is patient safe to drive:  No    Exam:    /72 (BP Location: Right arm, Patient Position: Sitting, Cuff Size: Adult Regular)   Pulse 64   Temp 97.2  F (36.2  C) (Temporal)   Ht 5' (152.4 cm)   Wt 143 lb 3.2 oz (65 kg)   BMI 27.97 kg/m       Wt Readings from Last 5 Encounters:   08/11/22 143 lb 3.2 oz (65 kg)   11/04/21 150 lb 3.2 oz (68.1 kg)   08/11/21 141 lb 14.4 oz (64.4 kg)   06/09/21 139 lb 3.2 oz (63.1 kg)   04/21/21 142 lb 3.2 oz (64.5 kg)      Alert. Follows simple commands. Safe gait. EOMI. Smile symmetrical. No drift, pronation, or tremor. Tone is normal.  FFN is done well. Heart exam without murmur; RRR.     Latest Reference Range & Units 11/04/21 16:22 07/05/22 08:57   Carbamazepine Level   mg/L 14.8 3.0   Valproic Acid Level   mg/L <3 <3      Latest Reference Range & Units 07/05/22 11:15   Sodium 133 - 144 mmol/L 140       IMPRESSION  1) Focal epilepsy; multifocal epileptiform discharges on EEG; left posterior hemisphere best developed. Per family had been under complete control since 2018 until May 2021 when presented with three seizures, apparently in setting of noncompliance. Now with another seizure in early July, again when ran out of AEDs. Seizure occurred through carbamazepine level of 3.0. Missed dose this morning, again because ran out of meds.  2) Developmental delay, irritability. Normal MRI. Irritability; unclear if depression, ASD, perhaps some tendency toward paranoia. Difficult to assess through language barrier. Does not appear overtly psychotic. Limited MSE through  today shows at least moderate level of developmental delay and I do not believe he can meaningfully study for to be examined for citizenship.  3) Vitamin D deficiency; family has not pursued treatment as previously discussed. We did not have time to address this further today.  4) Appears intolerant of levetiracetam.  5) Sleep maintenance insomnia, unresponsive to quetiapine.     PLAN:  1) Resume carbamazepine 400 mg twice a day.  mg tonite, then resume usual 250 mg twice a day. Precise regimen written out and discussed repeatedly with mother and .  2) Refilled AEDs for one year.  3) Discussed importance of compliance. Provided with clinic number.  4) Call if any seizures. RTC in six months. If does well through next visit, would probably discontinue VPA; he probably does not require two AEDs.     Total time in person today 34 min. Additional 7 min reviewing chart prior to visit. Reviewed ED notes, three additional labs. Discussed with mother who provided  unique information. Significant psychosocial obstacles including language, understanding of medical system, refill authorizations, etc. Total 41 min on day of visit. Note written on subsequent days.     Clay Dyson MD

## 2022-08-11 NOTE — PATIENT INSTRUCTIONS
Times of Days  AM  PM       Medication Tablet Size Number of Tablets/Capsules Total Daily Dosage    tegretol XR  200 mg  2    2      800 mg                      Wednesday Divalproex 250 mg      3      750 mg    Thursday and after divalproex  250 mg  1    1      500 mg                                                                             Try trazodone 50 mg at bedtime to help him with sleep.    The epilepsy clinic number is [phone].    Carry this with you at all times.  CONTINUE TAKING YOUR OTHER MEDICATIONS AS PREVIOUSLY DIRECTED.      * * *Do not store medications in the bathroom.  Keep medications away from children!* * *

## 2022-10-17 DIAGNOSIS — F84.0 AUTISM: ICD-10-CM

## 2022-10-17 NOTE — LETTER
[facility] [address], [address]  [place], [place]  [address]                                            TEL:[phone]  FAX:[phone]      René Kumar  [address] [place] [address]      October 24, 2022    Dear René,    We are concerned about your health care.  We recently provided you with a medication refill.  Many medications require routine follow-up with your provider.      At this time we ask that: You schedule a routine office visit with your physician to follow your QUEtiapine     Your prescription: Has been refilled for 1 month so you may have time for the above noted follow-up.      Thank you,      Teagan Mckeon C.N.P.

## 2022-10-20 RX ORDER — QUETIAPINE FUMARATE 50 MG/1
TABLET, FILM COATED ORAL
Qty: 30 TABLET | Refills: 0 | Status: SHIPPED | OUTPATIENT
Start: 2022-10-20 | End: 2023-04-07

## 2022-10-20 NOTE — TELEPHONE ENCOUNTER
Routing refill request to provider for review/approval because:  Labs out of range:  Lipid  Labs not current:  lipid  A break in medication  Patient needs to be seen because:  no recent appt, none scheduled.     Recent Labs   Lab Test 08/11/21  1430   CHOL 132   HDL 46   LDL 55   TRIG 157*      David MORENO RN

## 2023-01-16 DIAGNOSIS — F51.05 INSOMNIA DUE TO OTHER MENTAL DISORDER: ICD-10-CM

## 2023-01-16 DIAGNOSIS — F99 INSOMNIA DUE TO OTHER MENTAL DISORDER: ICD-10-CM

## 2023-01-16 NOTE — TELEPHONE ENCOUNTER
Patient is out of the Trazodone as of last night need refill last appt was 08/11/2022.       [id number] - [place], [place] - [address]  AT [facility] [address]  [facility] [address]  Phone: [phone] Fax: [phone]

## 2023-01-17 NOTE — TELEPHONE ENCOUNTER
traZODone (DESYREL) 50 MG tablet    Last Written Prescription Date:   8/11/2022  Last Fill Quantity: 31,   # refills: 5  Last Office Visit :  8/11/2022  Future Office visit:   2/8/2022  Routing refill request to provider for review/approval because:  Pt has visit on Feb 8, 2023.   Confirming by Provider it is okay to fill this med until Pt is seen in February 2023.   Please review and fill per Providers orders for Pt care.    Thank you Dr. Dyson.       Jocelynn Angeles RN  Central Triage Red Flags/Med Refills

## 2023-01-19 RX ORDER — TRAZODONE HYDROCHLORIDE 50 MG/1
50 TABLET, FILM COATED ORAL AT BEDTIME
Qty: 31 TABLET | Refills: 0 | Status: SHIPPED | OUTPATIENT
Start: 2023-01-19 | End: 2023-02-08

## 2023-02-08 ENCOUNTER — OFFICE VISIT (OUTPATIENT)
Dept: NEUROLOGY | Facility: CLINIC | Age: 28
End: 2023-02-08
Payer: COMMERCIAL

## 2023-02-08 VITALS
HEIGHT: 60 IN | SYSTOLIC BLOOD PRESSURE: 107 MMHG | DIASTOLIC BLOOD PRESSURE: 73 MMHG | HEART RATE: 60 BPM | BODY MASS INDEX: 27.97 KG/M2 | TEMPERATURE: 97.9 F

## 2023-02-08 DIAGNOSIS — G40.909 SEIZURE DISORDER (H): ICD-10-CM

## 2023-02-08 DIAGNOSIS — F51.05 INSOMNIA DUE TO OTHER MENTAL DISORDER: ICD-10-CM

## 2023-02-08 DIAGNOSIS — F99 INSOMNIA DUE TO OTHER MENTAL DISORDER: ICD-10-CM

## 2023-02-08 DIAGNOSIS — G40.211 PARTIAL SYMPTOMATIC EPILEPSY WITH COMPLEX PARTIAL SEIZURES, INTRACTABLE, WITH STATUS EPILEPTICUS (H): ICD-10-CM

## 2023-02-08 RX ORDER — TRAZODONE HYDROCHLORIDE 50 MG/1
50 TABLET, FILM COATED ORAL AT BEDTIME
Qty: 31 TABLET | Refills: 11 | Status: SHIPPED | OUTPATIENT
Start: 2023-02-08 | End: 2023-11-08

## 2023-02-08 RX ORDER — CARBAMAZEPINE 200 MG/1
TABLET, EXTENDED RELEASE ORAL
Qty: 124 TABLET | Refills: 11 | Status: SHIPPED | OUTPATIENT
Start: 2023-02-08 | End: 2023-11-08

## 2023-02-08 NOTE — LETTER
Date:February 9, 2023      Patient was self referred, no letter generated. Do not send.        [facility] Health Information

## 2023-02-08 NOTE — NURSING NOTE
Per patient - Pharmacy said the Carbamazepine was stopped.  Other  Gave them a refill for January.  3 nights off medication.  He is throwing up.  Carolyn Kowalski, CMA

## 2023-02-08 NOTE — PATIENT INSTRUCTIONS
Start up carbamazepine extended release two tablets twice daily.    If vomiting persists and he cannot keep medications down he should go to emergency room to get an injection to get the seizures under control until the vomiting stop.    He is using trazodone for sleep. I wrote prescription for this.  You should not use this until the vomiting has stopped for several days.    Our clinic number is [phone]. Please call this number if he is running out of prescriptions.

## 2023-02-08 NOTE — PROGRESS NOTES
[facility] Epilepsy Care Progress Note      Patient:  eRné Kumar  :  1995   Age:  27 year old   Today's Office Visit:  2023    Epilepsy Data:    Patient History  Primary Epileptologist/Provider: Clay Dyson M.D.  Patient Status: Controlled  Epilepsy Syndrome: Localization-related epilepsy unspecified  Epilepsy Syndrome Status: Undetermined - Evaluation in Progress  Age of Onset: Age 10  Etiology  : Unknown  Other Relevant Dx/ Issues: suspected ASD     Tests/Surgery History  Last EE2018    Seizure Record  Current Visit Date: 23  Previous Visit Date: 22  Months since last visit: 5.95  Seizure Type 1: Tonic-clonic seizures  Description of Sz Type 1: family describes fall, stiffening and shaking. he is unresponsive with tongue bite and urinary incontinence.  # of Type 1 Seizure since last visit: 3  Freq. Type 1 / Month: 0.5    Background History:  German male seizure onset 11 yo. EEG with multifocal (Lt T, Rt T, Lt PS, Rt PS) epileptiform discharges. Severe epilepsy reportedly with daily convulsive seizures while in [facility] but reportedly with complete control [place]. Mild to mod DE and poss ASD. CDI MRI normal with nl hippocampal volume. Breakthrough seizure May 2021, 2022 when ran out of meds. Levetiracetam was initiated but stopped because of insomnia, we substituted low dose VPA.    History of Present Illness:     Ran out of medications. Had three seizures last night.  He has not been taking carbamazepine for last three days.  Reports that Rx was cancelled in January but he got refill for one month, then ran out again.    Reports emesis x 3 since his seizures yesterday. No emesis prior to seizures. Denies diarrhea, fevers, abdominal pain.    Current Outpatient Medications   Medication Sig Dispense Refill     QUEtiapine (SEROQUEL) 50 MG tablet TAKE 1 TABLET(50 MG) BY MOUTH EVERY NIGHT AS NEEDED 30 tablet 0      carBAMazepine (TEGRETOL XR) 200 MG 12 hr tablet Take two pills twice daily (total 800 mg per day). (Patient not taking: Reported on 2/8/2023) 124 tablet 11     carBAMazepine (TEGRETOL XR) 200 MG 12 hr tablet Take 2 tablets (400 mg) by mouth 2 times daily for 31 days 124 tablet 0     divalproex sodium delayed-release (DEPAKOTE) 250 MG DR tablet Take one by mouth twice daily (Patient not taking: Reported on 2/8/2023) 62 tablet 11     divalproex sodium delayed-release (DEPAKOTE) 250 MG DR tablet Take 1 tablet (250 mg) by mouth 2 times daily for 31 days 62 tablet 0     traZODone (DESYREL) 50 MG tablet Take 1 tablet (50 mg) by mouth At Bedtime (Patient not taking: Reported on 2/8/2023) 31 tablet 0          Review of Systems:  Family reports vomiting since the seizures. Was not a problem two or three days ago.  No fevers. Denies abdominal pain or diarrhea.  Have you experienced a traumatic fall since your last visit: YES  Are these falls related to your seizures: YES: no none seizure related falls.    Other Issues:    Behavior continues reasonable but irritable and oppositional at times.  Requires supervision; otherwise may wander off, engage in inappropriate activities.  Is patient safe to drive:  No    Exam:    /73   Pulse 60   Temp 97.9  F (36.6  C) (Temporal)   Ht 5' (152.4 cm)   BMI 27.97 kg/m       Wt Readings from Last 5 Encounters:   08/11/22 143 lb 3.2 oz (65 kg)   11/04/21 150 lb 3.2 oz (68.1 kg)   08/11/21 141 lb 14.4 oz (64.4 kg)   06/09/21 139 lb 3.2 oz (63.1 kg)   04/21/21 142 lb 3.2 oz (64.5 kg)     Alert. Speaks only Belgian. Mimics some activities but not others. Does not always respond to family and  requests.  Further mental status examination not possible.  Normal gait. EOMI. Smile symmetrical. No drift, pronation, or tremor. FFN is done well. Strength is grossly intact.    Abdomen soft. Bowel sounds present. No tenderness or rebound.    IMPRESSION  1) Focal epilepsy; multifocal  epileptiform discharges on EEG; left posterior hemisphere best developed. Per family had been under complete control since 2018 until May 2021 when presented with three seizures, apparently in setting of noncompliance. Another seizure in early July 22, again when ran out of AEDs, carbamazepine level of 3.0. Now with cluster of three seizures after three days off AEDs.  2) Developmental delay, irritability. Normal MRI. Irritability; unclear if depression, ASD, perhaps some tendency toward paranoia. Difficult to assess through language barrier. Does not appear overtly psychotic. Previous limited MSE through  shows at least moderate level of developmental delay and I do not believe he can meaningfully study for to be examined for citizenship, or engage in competitive employment. No change since last visit.  3) Vitamin D deficiency; family has not pursued treatment as previously discussed. We did not have time to address this further today.  4) Appears intolerant of levetiracetam.  5) Sleep maintenance insomnia, apparently responded to trazodone.     PLAN:  1) Resume carbamazepine 400 mg twice a day. Asked to present to ED if vomiting continues. Discussed that he may not be absorbing ER AEDs in this situation and may need VPA or lacosamide as bridge until over GI issues. At that point may reinstitute carbamazepine; however will probably be deinduced if off carbamazepine for longer than one week and so may need to restart carbamazepine more gradually.  2) Refilled AEDs for one year.  3) Discussed importance of compliance. Provided with clinic number.  4) Refilled trazodone.  5) Filled out form for County.  6) Call if any seizures. RTC in9 months. If does well through next visit, would probably discontinue VPA; he probably does not require two AEDs.     Total time in person today 30 min. Additional 5 min reviewing chart prior to visit. Additional 8 minutes generating note. Discussed with mother who provided  unique information. Significant psychosocial obstacles including language, understanding of medical system, refill authorizations, etc. Filled out form for county. Total 43 min on day of visit.     Clay Dyson MD

## 2023-02-08 NOTE — LETTER
2023       RE: René Kumar  : 1995   MRN: [id number]      Dear Colleague,    Thank you for referring your patient, René Kumar, to the [facility] EPILEPSY CARE at [facility]. Please see a copy of my visit note below.    [facility] Epilepsy Care Progress Note      Patient:  René Kumar  :  1995   Age:  27 year old   Today's Office Visit:  2023    Epilepsy Data:    Patient History  Primary Epileptologist/Provider: Clay Dyson M.D.  Patient Status: Controlled  Epilepsy Syndrome: Localization-related epilepsy unspecified  Epilepsy Syndrome Status: Undetermined - Evaluation in Progress  Age of Onset: Age 10  Etiology  : Unknown  Other Relevant Dx/ Issues: suspected ASD     Tests/Surgery History  Last EE2018    Seizure Record  Current Visit Date: 23  Previous Visit Date: 22  Months since last visit: 5.95  Seizure Type 1: Tonic-clonic seizures  Description of Sz Type 1: family describes fall, stiffening and shaking. he is unresponsive with tongue bite and urinary incontinence.  # of Type 1 Seizure since last visit: 3  Freq. Type 1 / Month: 0.5    Background History:  Welsh male seizure onset 11 yo. EEG with multifocal (Lt T, Rt T, Lt PS, Rt PS) epileptiform discharges. Severe epilepsy reportedly with daily convulsive seizures while in [place] but reportedly with complete control [place]. Mild to mod DE and poss ASD. CDI MRI normal with nl hippocampal volume. Breakthrough seizure May 2021, 2022 when ran out of meds. Levetiracetam was initiated but stopped because of insomnia, we substituted low dose VPA.    History of Present Illness:     Ran out of medications. Had three seizures last night.  He has not been taking carbamazepine for last three days.  Reports that Rx was cancelled in January but he got refill for one month, then ran out  again.    Reports emesis x 3 since his seizures yesterday. No emesis prior to seizures. Denies diarrhea, fevers, abdominal pain.    Current Outpatient Medications   Medication Sig Dispense Refill     QUEtiapine (SEROQUEL) 50 MG tablet TAKE 1 TABLET(50 MG) BY MOUTH EVERY NIGHT AS NEEDED 30 tablet 0     carBAMazepine (TEGRETOL XR) 200 MG 12 hr tablet Take two pills twice daily (total 800 mg per day). (Patient not taking: Reported on 2/8/2023) 124 tablet 11     carBAMazepine (TEGRETOL XR) 200 MG 12 hr tablet Take 2 tablets (400 mg) by mouth 2 times daily for 31 days 124 tablet 0     divalproex sodium delayed-release (DEPAKOTE) 250 MG DR tablet Take one by mouth twice daily (Patient not taking: Reported on 2/8/2023) 62 tablet 11     divalproex sodium delayed-release (DEPAKOTE) 250 MG DR tablet Take 1 tablet (250 mg) by mouth 2 times daily for 31 days 62 tablet 0     traZODone (DESYREL) 50 MG tablet Take 1 tablet (50 mg) by mouth At Bedtime (Patient not taking: Reported on 2/8/2023) 31 tablet 0          Review of Systems:  Family reports vomiting since the seizures. Was not a problem two or three days ago.  No fevers. Denies abdominal pain or diarrhea.  Have you experienced a traumatic fall since your last visit: YES  Are these falls related to your seizures: YES: no none seizure related falls.    Other Issues:    Behavior continues reasonable but irritable and oppositional at times.  Requires supervision; otherwise may wander off, engage in inappropriate activities.  Is patient safe to drive:  No    Exam:    /73   Pulse 60   Temp 97.9  F (36.6  C) (Temporal)   Ht 5' (152.4 cm)   BMI 27.97 kg/m       Wt Readings from Last 5 Encounters:   08/11/22 143 lb 3.2 oz (65 kg)   11/04/21 150 lb 3.2 oz (68.1 kg)   08/11/21 141 lb 14.4 oz (64.4 kg)   06/09/21 139 lb 3.2 oz (63.1 kg)   04/21/21 142 lb 3.2 oz (64.5 kg)     Alert. Speaks only Maldivian. Mimics some activities but not others. Does not always respond to family  and  requests.  Further mental status examination not possible.  Normal gait. EOMI. Smile symmetrical. No drift, pronation, or tremor. FFN is done well. Strength is grossly intact.    Abdomen soft. Bowel sounds present. No tenderness or rebound.    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 Per family had been under complete control since 2018 until May 2021 when presented with three seizures, apparently in setting of noncompliance. Another seizure in early July 22, again when ran out of AEDs, carbamazepine level of 3.0. Now with cluster of three seizures after three days off AEDs.  2) Developmental delay, irritability. Normal MRI. Irritability; unclear if depression, ASD, perhaps some tendency toward paranoia. Difficult to assess through language barrier. Does not appear overtly psychotic. Previous limited MSE through  shows at least moderate level of developmental delay and I do not believe he can meaningfully study for to be examined for citizenship, or engage in competitive employment. No change since last visit.  3) Vitamin D deficiency; family has not pursued treatment as previously discussed. We did not have time to address this further today.  4) Appears intolerant of levetiracetam.  5) Sleep maintenance insomnia, apparently responded to trazodone.     PLAN:  1) Resume carbamazepine 400 mg twice a day. Asked to present to ED if vomiting continues. Discussed that he may not be absorbing ER AEDs in this situation and may need VPA or lacosamide as bridge until over GI issues. At that point may reinstitute carbamazepine; however will probably be deinduced if off carbamazepine for longer than one week and so may need to restart carbamazepine more gradually.  2) Refilled AEDs for one year.  3) Discussed importance of compliance. Provided with clinic number.  4) Refilled trazodone.  5) Filled out form for County.  6) Call  if any seizures. RTC in9 months. If does well through next visit, would probably discontinue VPA; he probably does not require two AEDs.     Total time in person today 30 min. Additional 5 min reviewing chart prior to visit. Additional 8 minutes generating note. Discussed with mother who provided unique information. Significant psychosocial obstacles including language, understanding of medical system, refill authorizations, etc. Filled out form for county. Total 43 min on day of visit.     Clay Dyson MD      Again, thank you for allowing me to participate in the care of your patient.      Sincerely,    Clay Dyson MD

## 2023-04-07 ENCOUNTER — OFFICE VISIT (OUTPATIENT)
Dept: INTERNAL MEDICINE | Facility: CLINIC | Age: 28
End: 2023-04-07
Payer: COMMERCIAL

## 2023-04-07 VITALS
TEMPERATURE: 97.1 F | DIASTOLIC BLOOD PRESSURE: 62 MMHG | OXYGEN SATURATION: 100 % | SYSTOLIC BLOOD PRESSURE: 98 MMHG | HEART RATE: 63 BPM | WEIGHT: 136.2 LBS | RESPIRATION RATE: 16 BRPM | BODY MASS INDEX: 23.25 KG/M2 | HEIGHT: 64 IN

## 2023-04-07 DIAGNOSIS — Z11.59 NEED FOR HEPATITIS C SCREENING TEST: ICD-10-CM

## 2023-04-07 DIAGNOSIS — Z13.29 SCREENING FOR THYROID DISORDER: ICD-10-CM

## 2023-04-07 DIAGNOSIS — R56.9 SEIZURE (H): ICD-10-CM

## 2023-04-07 DIAGNOSIS — Z28.21 INFLUENZA VACCINATION DECLINED: ICD-10-CM

## 2023-04-07 DIAGNOSIS — Z00.00 ANNUAL PHYSICAL EXAM: Primary | ICD-10-CM

## 2023-04-07 DIAGNOSIS — Z11.4 SCREENING FOR HIV (HUMAN IMMUNODEFICIENCY VIRUS): ICD-10-CM

## 2023-04-07 DIAGNOSIS — Z13.220 SCREENING FOR HYPERLIPIDEMIA: ICD-10-CM

## 2023-04-07 DIAGNOSIS — H66.001 NON-RECURRENT ACUTE SUPPURATIVE OTITIS MEDIA OF RIGHT EAR WITHOUT SPONTANEOUS RUPTURE OF TYMPANIC MEMBRANE: ICD-10-CM

## 2023-04-07 LAB
ALBUMIN SERPL BCG-MCNC: 4.2 G/DL (ref 3.5–5.2)
ALP SERPL-CCNC: 75 U/L (ref 40–129)
ALT SERPL W P-5'-P-CCNC: 21 U/L (ref 10–50)
ANION GAP SERPL CALCULATED.3IONS-SCNC: 12 MMOL/L (ref 7–15)
AST SERPL W P-5'-P-CCNC: 33 U/L (ref 10–50)
BASOPHILS # BLD AUTO: 0 10E3/UL (ref 0–0.2)
BASOPHILS NFR BLD AUTO: 0 %
BILIRUB SERPL-MCNC: 0.4 MG/DL
BUN SERPL-MCNC: 6.3 MG/DL (ref 6–20)
CALCIUM SERPL-MCNC: 9 MG/DL (ref 8.6–10)
CHLORIDE SERPL-SCNC: 104 MMOL/L (ref 98–107)
CHOLEST SERPL-MCNC: 114 MG/DL
CREAT SERPL-MCNC: 0.76 MG/DL (ref 0.67–1.17)
DEPRECATED HCO3 PLAS-SCNC: 25 MMOL/L (ref 22–29)
EOSINOPHIL # BLD AUTO: 0 10E3/UL (ref 0–0.7)
EOSINOPHIL NFR BLD AUTO: 1 %
ERYTHROCYTE [DISTWIDTH] IN BLOOD BY AUTOMATED COUNT: 11.6 % (ref 10–15)
GFR SERPL CREATININE-BSD FRML MDRD: >90 ML/MIN/1.73M2
GLUCOSE SERPL-MCNC: 93 MG/DL (ref 70–99)
HCT VFR BLD AUTO: 42.9 % (ref 40–53)
HDLC SERPL-MCNC: 43 MG/DL
HGB BLD-MCNC: 14.7 G/DL (ref 13.3–17.7)
IMM GRANULOCYTES # BLD: 0 10E3/UL
IMM GRANULOCYTES NFR BLD: 0 %
LDLC SERPL CALC-MCNC: 59 MG/DL
LYMPHOCYTES # BLD AUTO: 1.6 10E3/UL (ref 0.8–5.3)
LYMPHOCYTES NFR BLD AUTO: 59 %
MCH RBC QN AUTO: 30.4 PG (ref 26.5–33)
MCHC RBC AUTO-ENTMCNC: 34.3 G/DL (ref 31.5–36.5)
MCV RBC AUTO: 89 FL (ref 78–100)
MONOCYTES # BLD AUTO: 0.3 10E3/UL (ref 0–1.3)
MONOCYTES NFR BLD AUTO: 13 %
NEUTROPHILS # BLD AUTO: 0.7 10E3/UL (ref 1.6–8.3)
NEUTROPHILS NFR BLD AUTO: 27 %
NONHDLC SERPL-MCNC: 71 MG/DL
NRBC # BLD AUTO: 0 10E3/UL
NRBC BLD AUTO-RTO: 0 /100
PLATELET # BLD AUTO: 126 10E3/UL (ref 150–450)
POTASSIUM SERPL-SCNC: 3.6 MMOL/L (ref 3.4–5.3)
PROT SERPL-MCNC: 7.3 G/DL (ref 6.4–8.3)
RBC # BLD AUTO: 4.84 10E6/UL (ref 4.4–5.9)
SODIUM SERPL-SCNC: 141 MMOL/L (ref 136–145)
TRIGL SERPL-MCNC: 59 MG/DL
TSH SERPL DL<=0.005 MIU/L-ACNC: 1.39 UIU/ML (ref 0.3–4.2)
WBC # BLD AUTO: 2.7 10E3/UL (ref 4–11)

## 2023-04-07 PROCEDURE — 80050 GENERAL HEALTH PANEL: CPT | Performed by: INTERNAL MEDICINE

## 2023-04-07 PROCEDURE — 99213 OFFICE O/P EST LOW 20 MIN: CPT | Mod: 25 | Performed by: INTERNAL MEDICINE

## 2023-04-07 PROCEDURE — 36415 COLL VENOUS BLD VENIPUNCTURE: CPT | Performed by: INTERNAL MEDICINE

## 2023-04-07 PROCEDURE — 99395 PREV VISIT EST AGE 18-39: CPT | Performed by: INTERNAL MEDICINE

## 2023-04-07 PROCEDURE — 80061 LIPID PANEL: CPT | Performed by: INTERNAL MEDICINE

## 2023-04-07 PROCEDURE — 86803 HEPATITIS C AB TEST: CPT | Performed by: INTERNAL MEDICINE

## 2023-04-07 PROCEDURE — 87389 HIV-1 AG W/HIV-1&-2 AB AG IA: CPT | Performed by: INTERNAL MEDICINE

## 2023-04-07 RX ORDER — AMOXICILLIN 875 MG
875 TABLET ORAL 2 TIMES DAILY
Qty: 20 TABLET | Refills: 0 | Status: SHIPPED | OUTPATIENT
Start: 2023-04-07

## 2023-04-07 ASSESSMENT — ENCOUNTER SYMPTOMS
CARDIOVASCULAR NEGATIVE: 1
MUSCULOSKELETAL NEGATIVE: 1
RESPIRATORY NEGATIVE: 1
CONSTITUTIONAL NEGATIVE: 1
GASTROINTESTINAL NEGATIVE: 1
NEUROLOGICAL NEGATIVE: 1

## 2023-04-07 ASSESSMENT — PAIN SCALES - GENERAL: PAINLEVEL: NO PAIN (0)

## 2023-04-07 NOTE — PATIENT INSTRUCTIONS
Preventive Health Recommendations  Male Ages 21 - 25     Yearly exam:             See your health care provider every year in order to  o   Review health changes.   o   Discuss preventive care.    o   Review your medicines if your doctor has prescribed any.  You should be tested each year for STDs (sexually transmitted diseases).   Talk to your provider about cholesterol testing.    If you are at risk for diabetes, you should have a diabetes test (fasting glucose).    Shots: Get a flu shot each year. Get a tetanus shot every 10 years.     Nutrition:  Eat at least 5 servings of fruits and vegetables daily.   Eat whole-grain bread, whole-wheat pasta and brown rice instead of white grains and rice.   Get adequate calcium and Vitamin D.     Lifestyle  Exercise for at least 150 minutes a week (30 minutes a day, 5 days a week). This will help you control your weight and prevent disease.   Limit alcohol to one drink per day.   No smoking.   Wear sunscreen to prevent skin cancer.   See your dentist every six months for an exam and cleaning.

## 2023-04-07 NOTE — PROGRESS NOTES
"SUBJECTIVE:   CC: René is an 27 year old who presents for preventative health visit.        View : No data to display.            {Split Bill scripting  The purpose of this visit is to discuss your medical history and prevent health problems before you are sick. You may be responsible for a co-pay, coinsurance, or deductible if your visit today includes services such as checking on a sore throat, having an x-ray or lab test, or treating and evaluating a new or existing condition :911960}  {Patient advised of split billing (Optional):201268}  HPI  {Add if <65 person on Medicare  - Required Questions (Optional):898230}  {Outside tests to abstract? :385571}    {additional problems to add (Optional):844076}    Today's PHQ-2 Score:       8/11/2022     3:27 PM   PHQ-2 ( 1999 Pfizer)   Q1: Little interest or pleasure in doing things 0   Q2: Feeling down, depressed or hopeless 1   PHQ-2 Score 1           Social History     Tobacco Use     Smoking status: Never     Smokeless tobacco: Never   Vaping Use     Vaping status: Never Used     Passive vaping exposure: Yes   Substance Use Topics     Alcohol use: No     {Rooming staff  Click this link to complete the Prescreen if response below is not for today's visit  Alcohol Use Prescreen >3 drinks/day or > 7 drinks/week.  If the prescreen question answer is YES, complete the full AUDIT  :944003}        3/29/2018     8:15 AM   Alcohol Use   Prescreen: >3 drinks/day or >7 drinks/week? Not Applicable   {add AUDIT responses (Optional) (A score of 7 for adult men is an indication of hazardous drinking; a score of 8 or more is an indication of an alcohol use disorder.  A score of 7 or more for adult women is an indication of hazardous drinking or an alchohol use disorder):733298}    Last PSA: No results found for: PSA    Reviewed orders with patient. Reviewed health maintenance and updated orders accordingly - { :682138::\"Yes\"}  {Chronicprobdata (optional):134150}    Reviewed " "and updated as needed this visit by clinical staff   Tobacco  Allergies  Meds              Reviewed and updated as needed this visit by Provider                 {HISTORY OPTIONS (Optional):241978}    Review of Systems  {MALE ROS (Optional):772552::\"CONSTITUTIONAL: NEGATIVE for fever, chills, change in weight\",\"INTEGUMENTARY/SKIN: NEGATIVE for worrisome rashes, moles or lesions\",\"EYES: NEGATIVE for vision changes or irritation\",\"ENT: NEGATIVE for ear, mouth and throat problems\",\"RESP: NEGATIVE for significant cough or SOB\",\"CV: NEGATIVE for chest pain, palpitations or peripheral edema\",\"GI: NEGATIVE for nausea, abdominal pain, heartburn, or change in bowel habits\",\" male: negative for dysuria, hematuria, decreased urinary stream, erectile dysfunction, urethral discharge\",\"MUSCULOSKELETAL: NEGATIVE for significant arthralgias or myalgia\",\"NEURO: NEGATIVE for weakness, dizziness or paresthesias\",\"PSYCHIATRIC: NEGATIVE for changes in mood or affect\"}    OBJECTIVE:   There were no vitals taken for this visit.    Physical Exam  {Exam Choices (Optional):986843}    {Diagnostic Test Results (Optional):401231::\"Diagnostic Test Results:\",\"Labs reviewed in Epic\"}    ASSESSMENT/PLAN:   {Diag Picklist:672500}    {Patient advised of split billing (Optional):555774}      COUNSELING:   {MALE COUNSELING MESSAGES:657786::\"Reviewed preventive health counseling, as reflected in patient instructions\"}      BMI:   Estimated body mass index is 27.97 kg/m  as calculated from the following:    Height as of 2/8/23: 1.524 m (5').    Weight as of 8/11/22: 65 kg (143 lb 3.2 oz).   {Weight Management Plan needed for ACO:981995}      He reports that he has never smoked. He has never used smokeless tobacco.      {Counseling Resources  [place] Preventive Services Task Force  Cholesterol Screening  Health diet/nutrition  Pooled Cohorts Equation Calculator  USDA's MyPlate  ASA Prophylaxis  Lung CA Screening  Osteoporosis prevention/bone health " :781516}  {Prostate Cancer Screening  Consider for men 55-69 per guidance from USPSTF :925912}    Nick Vincent MD  [facility]

## 2023-04-07 NOTE — PROGRESS NOTES
SUBJECTIVE:   CC: René is an 27 year old who presents for preventative health visit.        View : No data to display.              Patient has been advised of split billing requirements and indicates understanding: Yes  Patient is a 27-year-old male who presents to the clinic for his annual physical.  His main concern today is regards to some ongoing issues with ear pain.  Patient has been experiencing right-sided ear pain for approximately 1 month.  There have been no reports of drainage from the affected ear.  Patient has not been taking medications for management of his discomfort.  He has no prior history of ear issues.  Otherwise, he is doing well.  Patient has a stable appetite.  He is stooling voiding without issue.  Patient is fasting for lab work today.        Today's PHQ-2 Score:       8/11/2022     3:27 PM   PHQ-2 ( 1999 Pfizer)   Q1: Little interest or pleasure in doing things 0   Q2: Feeling down, depressed or hopeless 1   PHQ-2 Score 1           Social History     Tobacco Use     Smoking status: Never     Smokeless tobacco: Never   Vaping Use     Vaping status: Never Used     Passive vaping exposure: Yes   Substance Use Topics     Alcohol use: No             3/29/2018     8:15 AM   Alcohol Use   Prescreen: >3 drinks/day or >7 drinks/week? Not Applicable       Last PSA: No results found for: PSA    Reviewed orders with patient. Reviewed health maintenance and updated orders accordingly - Yes  Lab work is in process    Reviewed and updated as needed this visit by clinical staff   Tobacco  Allergies  Meds              Reviewed and updated as needed this visit by Provider                     Review of Systems   Constitutional: Negative.    HENT: Positive for ear pain.    Respiratory: Negative.    Cardiovascular: Negative.    Gastrointestinal: Negative.    Genitourinary: Negative.    Musculoskeletal: Negative.    Skin: Negative.    Neurological: Negative.          OBJECTIVE:   BP 98/62   Pulse 63   " Temp 97.1  F (36.2  C) (Tympanic)   Resp 16   Ht 1.636 m (5' 4.4\")   Wt 61.8 kg (136 lb 3.2 oz)   SpO2 100%   BMI 23.09 kg/m      Physical Exam  Vitals reviewed.   HENT:      Head: Normocephalic and atraumatic.      Right Ear: Tympanic membrane is erythematous and bulging.      Left Ear: Tympanic membrane and ear canal normal.      Mouth/Throat:      Mouth: Mucous membranes are moist.      Pharynx: Oropharynx is clear.   Eyes:      Extraocular Movements: Extraocular movements intact.      Conjunctiva/sclera: Conjunctivae normal.      Pupils: Pupils are equal, round, and reactive to light.   Cardiovascular:      Rate and Rhythm: Normal rate and regular rhythm.      Pulses: Normal pulses.      Heart sounds: Normal heart sounds.   Pulmonary:      Effort: Pulmonary effort is normal.      Breath sounds: Normal breath sounds.   Abdominal:      General: Abdomen is flat. Bowel sounds are normal.      Palpations: Abdomen is soft.   Musculoskeletal:         General: Normal range of motion.   Skin:     General: Skin is warm and dry.      Capillary Refill: Capillary refill takes less than 2 seconds.   Neurological:      Mental Status: He is alert. Mental status is at baseline.       Diagnostic Test Results: CMP, CBC, FLP, hepatitis C, HIV screening ,and TSH are pending.    ASSESSMENT/PLAN:   (Z00.00) Annual physical exam  (primary encounter diagnosis)  Comment: At this time, patient does have a relatively unremarkable examination.  We did spend some time discussing appropriate dietary lifestyle modifications to help keep his weight and blood pressure under good control.  Fasting labs are pending.  All health maintenance items were addressed.    (Z11.4) Screening for HIV (human immunodeficiency virus)  Comment: HIV Antigen Antibody Combo is pending.    (Z11.59) Need for hepatitis C screening test  Comment: Hepatitis C Screen Reflex to HCV RNA Quant and         Genotype is pending.    (Z13.220) Screening for " "hyperlipidemia  Comment: Lipid panel reflex to direct LDL Fasting is pending.    (Z13.29) Screening for thyroid disorder  Comment: TSH with free T4 reflex is pending.    (H66.001) Non-recurrent acute suppurative otitis media of right ear without spontaneous rupture of tympanic membrane  Comment: Patient does appear to have a case of otitis media involving his right ear.  We did elect to proceed with antibiotic therapy.  Patient will be placed on amoxicillin 875 mg by mouth twice per day for the next 10 days.  Side effects of medication were reviewed.  Patient can use Tylenol or ibuprofen on an as-needed basis for management of any discomfort or fever.    (R56.9) Seizure (H)  Comment: Followed by neurology.    (Z28.21) Influenza vaccination declined  Comment: Influenza vaccination declined.    Patient has been advised of split billing requirements and indicates understanding: Yes      COUNSELING:   Reviewed preventive health counseling, as reflected in patient instructions      BMI:   Estimated body mass index is 23.09 kg/m  as calculated from the following:    Height as of this encounter: 1.636 m (5' 4.4\").    Weight as of this encounter: 61.8 kg (136 lb 3.2 oz).         He reports that he has never smoked. He has never used smokeless tobacco.        Nick Vincent MD  [facility]  "

## 2023-04-08 LAB
HCV AB SERPL QL IA: NONREACTIVE
HIV 1+2 AB+HIV1 P24 AG SERPL QL IA: NONREACTIVE

## 2023-05-03 ENCOUNTER — TRANSFERRED RECORDS (OUTPATIENT)
Dept: HEALTH INFORMATION MANAGEMENT | Facility: CLINIC | Age: 28
End: 2023-05-03
Payer: COMMERCIAL

## 2023-11-08 ENCOUNTER — OFFICE VISIT (OUTPATIENT)
Dept: NEUROLOGY | Facility: CLINIC | Age: 28
End: 2023-11-08
Payer: COMMERCIAL

## 2023-11-08 VITALS
DIASTOLIC BLOOD PRESSURE: 66 MMHG | WEIGHT: 138.2 LBS | OXYGEN SATURATION: 100 % | TEMPERATURE: 97.4 F | SYSTOLIC BLOOD PRESSURE: 102 MMHG | HEART RATE: 75 BPM | BODY MASS INDEX: 23.43 KG/M2

## 2023-11-08 DIAGNOSIS — F51.05 INSOMNIA DUE TO OTHER MENTAL DISORDER: ICD-10-CM

## 2023-11-08 DIAGNOSIS — E55.9 VITAMIN D DEFICIENCY: Primary | ICD-10-CM

## 2023-11-08 DIAGNOSIS — F99 INSOMNIA DUE TO OTHER MENTAL DISORDER: ICD-10-CM

## 2023-11-08 DIAGNOSIS — G40.909 SEIZURE DISORDER (H): ICD-10-CM

## 2023-11-08 RX ORDER — CARBAMAZEPINE 200 MG/1
TABLET, EXTENDED RELEASE ORAL
Qty: 124 TABLET | Refills: 11 | Status: SHIPPED | OUTPATIENT
Start: 2023-11-08

## 2023-11-08 RX ORDER — TRAZODONE HYDROCHLORIDE 50 MG/1
50 TABLET, FILM COATED ORAL AT BEDTIME
Qty: 31 TABLET | Refills: 11 | Status: SHIPPED | OUTPATIENT
Start: 2023-11-08

## 2023-11-08 NOTE — PATIENT INSTRUCTIONS
Keep taking the carbamazepine extended release (200 mg) two tablets twice per day.    You should take additional vitamin D. You can get in your pharmacy over the counter. The size is 400 international unit(s) (international units). Mr Kumar should take one tablet twice per day. You can show this to your pharmacist if you have questions. This will help with vitamin D deficiency and help prevent bone fractures.

## 2023-11-08 NOTE — LETTER
2023       RE: René Kumar  : 1995   MRN: [id number]      Dear Colleague,    Thank you for referring your patient, René Kumar, to the [facility] EPILEPSY CARE at [facility]. Please see a copy of my visit note below.    [facility] Epilepsy Care Progress Note      Patient:  René Kumar  :  1995   Age:  27 year old   Today's Office Visit:  2023    Epilepsy Data:    Patient History  Primary Epileptologist/Provider: Clay Dyson M.D.  Patient Status: Controlled  Epilepsy Syndrome: Localization-related epilepsy unspecified  Epilepsy Syndrome Status: Undetermined - Evaluation in Progress  Age of Onset: Age 10  Etiology  : Unknown  Other Relevant Dx/ Issues: suspected ASD     Tests/Surgery History  Last EE2018    Seizure Record  Current Visit Date: 23  Previous Visit Date: 23  Months since last visit: 8.97  Seizure Type 1: Tonic-clonic seizures  Description of Sz Type 1: family describes fall, stiffening and shaking. he is unresponsive with tongue bite and urinary incontinence.  # of Type 1 Seizure since last visit: 0  Freq. Type 1 / Month: 0    Background History:  Bulgarian male seizure onset 11 yo. EEG with multifocal (Lt T, Rt T, Lt PS, Rt PS) epileptiform discharges. Severe epilepsy reportedly with daily convulsive seizures while in [place] but [place] with complete control [place]. Mild to mod DE and poss ASD. CDI MRI normal with nl hippocampal volume. Breakthrough seizure May 2021, 2022 when ran out of meds. Levetiracetam was initiated but stopped because of insomnia, we substituted low dose VPA.    History of Present Illness:     No seizures. Last seizure in February when he ran out of medications.  He appears to have stopped DVP; we were intending to do this if he did well.  No seizures after DVP was discontinued.    Current  Outpatient Medications   Medication Sig Dispense Refill    carBAMazepine (TEGRETOL XR) 200 MG 12 hr tablet Take two pills twice daily (total 800 mg per day). 124 tablet 11    traZODone (DESYREL) 50 MG tablet Take 1 tablet (50 mg) by mouth At Bedtime 31 tablet 11    amoxicillin (AMOXIL) 875 MG tablet Take 1 tablet (875 mg) by mouth 2 times daily 20 tablet 0        Medication Notes:    He is no longer taking VPA. Not clear when he stopped.    AED Medication Compliance:  compliant all of the time  Using a pill box:  No    Review of Systems:  No vomiting, diarrhea, fevers, hematuria or kidney stones.  Have you experienced a traumatic fall since your last visit: NO  Are these falls related to your seizures: NO    Other Issues:    No other significant health problems.  Family reports he is tearful on occasion. Talks to himself on occasion. Not violent and generally responds to family.  No suicidal ideation or suicide attempts.  Family reports sleeps well with trazodone.  Is patient safe to drive:  No    Exam:    /66 (BP Location: Right arm, Patient Position: Sitting, Cuff Size: Adult Regular)   Pulse 75   Temp 97.4  F (36.3  C) (Temporal)   Wt 138 lb 3.2 oz (62.7 kg)   SpO2 100%   BMI 23.43 kg/m       Wt Readings from Last 5 Encounters:   11/08/23 138 lb 3.2 oz (62.7 kg)   04/07/23 136 lb 3.2 oz (61.8 kg)   08/11/22 143 lb 3.2 oz (65 kg)   11/04/21 150 lb 3.2 oz (68.1 kg)   08/11/21 141 lb 14.4 oz (64.4 kg)     Alert. Speaks only Ecuadorean. Mimics some activities but not others. Does not always respond to family and  requests.  Further mental status examination not possible.  Normal gait. EOMI. Smile symmetrical. No drift, pronation, or tremor. FFN is done well. Strength is grossly intact.     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 Per family had been under complete control since 2018 until May 2021 when presented with three  seizures, apparently in setting of noncompliance. Further seizures in early July 2022, Feb 2023, again when ran out of AEDs.  2) Developmental delay, irritability. Normal MRI. Irritability; unclear if depression, ASD, perhaps some tendency toward paranoia. Difficult to assess through language barrier. Does not appear overtly psychotic. Previous limited MSE through  shows at least moderate level of developmental delay and I do not believe he can meaningfully study for to be examined for citizenship, or engage in competitive employment. No change since last visit.  3) Vitamin D deficiency; family has not pursued treatment as previously discussed.  4) Appears intolerant of levetiracetam.  5) Sleep maintenance insomnia, apparently responded to trazodone.     PLAN:  1) Continue carbamazepine 400 mg twice a day. Continue trazodone.  2) Cbz level to confirm compliance. Sodium to exclude carbamazepine associted hyponatremia. Vit D level for follow.  3) Discussed importance of compliance. Provided with clinic number.  4) Provided information regarding Vitamin D supplementation in AVS. Asked to get Vit D 400 international unit(s) OTC and use 400 international unit(s) twice a day.  5) Call if any seizures. RTC in one year.     Total time in person today 23 min. Additional 5 min reviewing chart prior to visit. Additional 8 minutes generating note. Discussed with family who provided unique information. Significant psychosocial obstacles including language, understanding of medical system, refill authorizations, etc.           Again, thank you for allowing me to participate in the care of your patient.      Sincerely,    Clay Dyson MD

## 2023-11-08 NOTE — PROGRESS NOTES
[facility] Epilepsy Care Progress Note      Patient:  René Kumar  :  1995   Age:  27 year old   Today's Office Visit:  2023    Epilepsy Data:    Patient History  Primary Epileptologist/Provider: Clay Dyson M.D.  Patient Status: Controlled  Epilepsy Syndrome: Localization-related epilepsy unspecified  Epilepsy Syndrome Status: Undetermined - Evaluation in Progress  Age of Onset: Age 10  Etiology  : Unknown  Other Relevant Dx/ Issues: suspected ASD     Tests/Surgery History  Last EE2018    Seizure Record  Current Visit Date: 23  Previous Visit Date: 23  Months since last visit: 8.97  Seizure Type 1: Tonic-clonic seizures  Description of Sz Type 1: family describes fall, stiffening and shaking. he is unresponsive with tongue bite and urinary incontinence.  # of Type 1 Seizure since last visit: 0  Freq. Type 1 / Month: 0    Background History:  Portuguese male seizure onset 9 yo. EEG with multifocal (Lt T, Rt T, Lt PS, Rt PS) epileptiform discharges. Severe epilepsy reportedly with daily convulsive seizures while in \[facility]\"" but reportedly with complete control [place]. Mild to mod DE and poss ASD. CDI MRI normal with nl hippocampal volume. Breakthrough seizure May 2021, 2022 when ran out of meds. Levetiracetam was initiated but stopped because of insomnia, we substituted low dose VPA.    History of Present Illness:     No seizures. Last seizure in February when he ran out of medications.  He appears to have stopped DVP; we were intending to do this if he did well.  No seizures after DVP was discontinued.    Current Outpatient Medications   Medication Sig Dispense Refill    carBAMazepine (TEGRETOL XR) 200 MG 12 hr tablet Take two pills twice daily (total 800 mg per day). 124 tablet 11    traZODone (DESYREL) 50 MG tablet Take 1 tablet (50 mg) by mouth At Bedtime 31 tablet 11    amoxicillin (AMOXIL) 875 MG tablet  Take 1 tablet (875 mg) by mouth 2 times daily 20 tablet 0        Medication Notes:    He is no longer taking VPA. Not clear when he stopped.    AED Medication Compliance:  compliant all of the time  Using a pill box:  No    Review of Systems:  No vomiting, diarrhea, fevers, hematuria or kidney stones.  Have you experienced a traumatic fall since your last visit: NO  Are these falls related to your seizures: NO    Other Issues:    No other significant health problems.  Family reports he is tearful on occasion. Talks to himself on occasion. Not violent and generally responds to family.  No suicidal ideation or suicide attempts.  Family reports sleeps well with trazodone.  Is patient safe to drive:  No    Exam:    /66 (BP Location: Right arm, Patient Position: Sitting, Cuff Size: Adult Regular)   Pulse 75   Temp 97.4  F (36.3  C) (Temporal)   Wt 138 lb 3.2 oz (62.7 kg)   SpO2 100%   BMI 23.43 kg/m       Wt Readings from Last 5 Encounters:   11/08/23 138 lb 3.2 oz (62.7 kg)   04/07/23 136 lb 3.2 oz (61.8 kg)   08/11/22 143 lb 3.2 oz (65 kg)   11/04/21 150 lb 3.2 oz (68.1 kg)   08/11/21 141 lb 14.4 oz (64.4 kg)     Alert. Speaks only Citizen of the [place]. Mimics some activities but not others. Does not always respond to family and  requests.  Further mental status examination not possible.  Normal gait. EOMI. Smile symmetrical. No drift, pronation, or tremor. FFN is done well. Strength is grossly intact.     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 Per family had been under complete control since 2018 until May 2021 when presented with three seizures, apparently in setting of noncompliance. Further seizures in early July 2022, Feb 2023, again when ran out of AEDs.  2) Developmental delay, irritability. Normal MRI. Irritability; unclear if depression, ASD, perhaps some tendency toward paranoia. Difficult to assess through language barrier.  Does not appear overtly psychotic. Previous limited MSE through  shows at least moderate level of developmental delay and I do not believe he can meaningfully study for to be examined for citizenship, or engage in competitive employment. No change since last visit.  3) Vitamin D deficiency; family has not pursued treatment as previously discussed.  4) Appears intolerant of levetiracetam.  5) Sleep maintenance insomnia, apparently responded to trazodone.     PLAN:  1) Continue carbamazepine 400 mg twice a day. Continue trazodone.  2) Cbz level to confirm compliance. Sodium to exclude carbamazepine associted hyponatremia. Vit D level for follow.  3) Discussed importance of compliance. Provided with clinic number.  4) Provided information regarding Vitamin D supplementation in AVS. Asked to get Vit D 400 international unit(s) OTC and use 400 international unit(s) twice a day.  5) Call if any seizures. RTC in one year.     Total time in person today 23 min. Additional 5 min reviewing chart prior to visit. Additional 8 minutes generating note. Discussed with family who provided unique information. Significant psychosocial obstacles including language, understanding of medical system, refill authorizations, etc.     Clay Dyson MD

## 2023-11-09 LAB
CARBAMAZEPINE SERPL-MCNC: 8.3 UG/ML (ref 4–12)
SODIUM SERPL-SCNC: 142 MMOL/L (ref 135–145)
VIT D+METAB SERPL-MCNC: 16 NG/ML (ref 20–50)

## 2024-09-30 NOTE — PROGRESS NOTES
"PSYCHIATRIC  DIAGNOSTIC  EVALUATION  INTEGRATED PRIMARY CARE       60 minute evaluation    IDENTIFICATION   René Kumar is a 22 year old male who was referred for evaluation of agitation.  History was provided by mother, brother, and patient.      CHIEF COMPLAINT   Family: \"We would like to know if something is wrong with his brain\"    HISTORY OF PRESENT ILLNESS     Patient arrives to appointment today with his mother and brother. Mother reports that patient arrived in [place] in February 2017. He has a history of a seizure disorder. Patient's seizures have been controlled recently, but he is having issues with his behaviors. For instance, if a family member sits on a chair he wants to sit, he becomes agitated. He will hit people when mad, and family generally tries to allow him to calm down. He can also be triggered by things that mom says. The behavior has been an issue since patient developed a seizure disorder around 10 years old. Mother does not feel that patient is currently able to function on his own. Patient does have a [facility]  but family does not know how to contact them.     Developmentally, mother reports that patient began speaking around 2-3 years of age. However, mother was not around during this time. When he was younger, he used to be more talkative and have friends. Mother was not aware of any developmental delay. After he developed seizures, he no longer was active as he used to be. At patient's sickest, patient was in a coma for 5 days. Patient was subsequently prescribed Tegretol, which family feels helpful. Neither patient or family recall an EEG of brain scan having been done.     When asked directly, patient states that nothing is wrong with him other than having a seizure a long time ago. During the day, he spends his time praying but does nothing else. He states he lives with his mother and sister in [place], [place]. He has attended some school in [place]. He understands some " "arithmetic. When asked about his mood, he states that he generally feels fine. He enjoys playing soccer and football outdoors. Does not identify any future plans.    PSYCH ROS: DEPRESSION:  reports-easily wakens to noises;  DENIES- depressed mood  PSYCHOSIS:  reports-none;  DENIES- auditory hallucinations and visual hallucinations   DYSREGULATION:  reports-aggressive and irritable      MEDICAL ROS:  Reports headaches, photophobia .  Denies  chest pain, GI upset, auditory issues     PSYCHIATRIC HISTORY   SIB [method, most recent]- none  Violence/Aggression Hx- yes, toward family when agitated  Psychosis Hx- none      PAST MEDICATION TRIALS   Tegretol (primarily for seizure prophylaxis)    SUBSTANCE USE                                                                                    None    SOCIAL HISTORY                                                                      patient reported   Living Situation/Family/Relationships- currently living at mother's home with mother and sister  Social/Spiritual Support- supported by family; does not identify having any friends  Early History/Education-  Some schooling, though level is unclear    FAMILY HISTORY                                                                       patient reported   - nephew with autism  - no family history of mental health issues     MEDICAL / SURGICAL HISTORY                                 MEDICAL TEAM:     PCP- Derrick Owusu          Neurologic Hx:   seizure- yes         ALLERGY   Review of patient's allergies indicates no known allergies.    MENTAL STATUS EXAM                                                             Alertness: alert  and oriented   Appearance: adequately groomed  Behavior/Demeanor: cooperative; arm crossed for much of interview; fair eye contact  Speech: regular per   Language: intact  Psychomotor: fidgety  Mood:  \"nothing is wrong with me\"  Affect: restricted; was congruent to mood; was congruent to " content  Thought Process/Associations: concrete  Thought Content:  Denies active/passive suicidal ideation, violent ideation and psychotic thought   Perception:  Denies hallucinations  Insight: good  Judgment: good  Cognition:  Oriented to being in the doctor's office; suspect low-normal intelligence  Gait and Station: unremarkable     LABS/IMAGING                                                                                                                  None    PSYCHIATRIC DIAGNOSES                                                                                                 Seizure Disorder  R/o Autism Spectrum Disorder  R/o Intellectual Disability    ASSESSMENT                                                    René Kumar is a 22 year old male who presents today with his family for evaluation of behavioral issues including agitation and aggression that began after he developed a seizure disorder around age 10.The accuracy of the history provided by family is called into question, as family had previously reported that patient is non-verbal and has not had any form of education, neither of which appear to be true. Furthermore, the  brought up concerns that the family did not want the patient to speak, and instead, wanted to speak on his behalf. Patient's presentation with somewhat concrete thinking, rather limited interests, and family history of autism raises a question of ASD in this patient. Developmentally, patient's early history is unclear as it is provided by his mother, who was not actually present during this period of time. His current level of intellectual functioning is difficult to assess given the language barrier and the tendency for family to speak for patient.      Patient reportedly has a , but it does not appear that the family understands how to access this resource. Will have the clinic  meet with family to identify additional resources to help  patient with socialization and education. He does not appear to have a full neurological work-up including EEG and MRI at this point, so will make neurological referral for this.  Also discussed starting Seroquel to target sleep and irritability. Should concern neuropsychological testing in future to better assess cognitive abilities.     TREATMENT RISK STATEMENT:  The risks, benefits, alternatives and potential adverse effects have been explained and are understood by the pt. The pt agrees to the treatment plan with the ability to do so. The pt knows to call the clinic for any problems or to access emergency care if needed.      PLAN                                                                                                       1) MEDICATION:       - Start Seroquel 25mg at bedtime for sleep AND 25mg daily as needed for agitation    2) THERAPY:  None    3) Referrals: Social work. Patient's family can be reached by following numbers:  Brother: [phone]  Aunt: [phone]     4) Others: patient requires MRI/EEG; should also obtain neuropsychological testing in the future    5) RTC: 1-2 months    RESIDENT:  Tomas hCambers MD    Patient was staffed in clinic with Dr. Chambers who will sign the note.   I, Dr. Chambers, had an opportunity to interview this patient with the resident and was present for key portions of the exam. I agree with the assessment and plan outlined above.     On my interview the patient was dressed in casual clothing and appeared stated age. Patient was pleasant and cooperative, mood neutral,flat, speech was coherent and goal oriented. Associations tight. Thought process was logical and linear. Content of thought without psychosis or suicidal ideation.Patient alert and oriented x 3.  Recent and remote memory, concentration, fund of knowledge, and use of language were baseline. Insight and judgement gaurded Gait and station within normal limits.     Dr. LIAM Chambers   normal performance

## 2024-11-13 ENCOUNTER — OFFICE VISIT (OUTPATIENT)
Dept: NEUROLOGY | Facility: CLINIC | Age: 29
End: 2024-11-13
Payer: COMMERCIAL

## 2024-11-13 VITALS
OXYGEN SATURATION: 98 % | SYSTOLIC BLOOD PRESSURE: 112 MMHG | HEART RATE: 107 BPM | WEIGHT: 149.2 LBS | TEMPERATURE: 97.3 F | DIASTOLIC BLOOD PRESSURE: 76 MMHG | BODY MASS INDEX: 25.29 KG/M2

## 2024-11-13 DIAGNOSIS — G40.909 SEIZURE DISORDER (H): ICD-10-CM

## 2024-11-13 DIAGNOSIS — F51.05 INSOMNIA DUE TO OTHER MENTAL DISORDER: ICD-10-CM

## 2024-11-13 DIAGNOSIS — F99 INSOMNIA DUE TO OTHER MENTAL DISORDER: ICD-10-CM

## 2024-11-13 LAB
AST SERPL W P-5'-P-CCNC: 27 U/L (ref 0–45)
CARBAMAZEPINE SERPL-MCNC: 11.2 UG/ML (ref 4–12)
SODIUM SERPL-SCNC: 140 MMOL/L (ref 135–145)

## 2024-11-13 RX ORDER — TRAZODONE HYDROCHLORIDE 50 MG/1
50 TABLET, FILM COATED ORAL AT BEDTIME
Qty: 31 TABLET | Refills: 11 | Status: SHIPPED | OUTPATIENT
Start: 2024-11-13

## 2024-11-13 RX ORDER — CARBAMAZEPINE 200 MG/1
TABLET, EXTENDED RELEASE ORAL
Qty: 124 TABLET | Refills: 11 | Status: SHIPPED | OUTPATIENT
Start: 2024-11-13

## 2024-11-13 NOTE — LETTER
2024       RE: René Kumar  : 1995   MRN: [id number]      Dear Colleague,    Thank you for referring your patient, René Kumar, to the [facility] EPILEPSY CARE at [facility]. Please see a copy of my visit note below.    [facility] Epilepsy Care Progress Note      Patient:  René Kumar  :  1995   Age:  28 year old   Today's Office Visit:  2024    Epilepsy Data:    Patient History  Primary Epileptologist/Provider: Clay Dyson M.D.  Patient Status: Controlled  Epilepsy Syndrome: Localization-related epilepsy unspecified  Epilepsy Syndrome Status: Undetermined - Evaluation in Progress  Age of Onset: Age 10  Etiology  : Unknown  Other Relevant Dx/ Issues: suspected ASD     Tests/Surgery History  Last EE2018    Seizure Record  Current Visit Date: 24  Previous Visit Date: 23  Months since last visit:   Seizure Type 1: Tonic-clonic seizures  Description of Sz Type 1: family describes fall, stiffening and shaking. he is unresponsive with tongue bite and urinary incontinence.  # of Type 1 Seizure since last visit: 0  Freq. Type 1 / Month: 0    Background History:  Malagasy male seizure onset 9 yo. EEG with multifocal (Lt T, Rt T, Lt PS, Rt PS) epileptiform discharges. Severe epilepsy reportedly with daily convulsive seizures while in [place] but reportedly with complete control following [place]. Mild to mod DE and poss ASD. CDI MRI normal with nl hippocampal volume. Breakthrough seizure May 2021, 2022 when ran out of meds. Levetiracetam was initiated but stopped because of insomnia, we substituted low dose VPA.    History of Present Illness:     No seizures since last seen a year ago. Last seizure February of last year.  Last visit he had just discontinue the DVP and he has remained seizure free.    Family feels that his cognitive status  "is the same.    Current Outpatient Medications   Medication Sig Dispense Refill     carBAMazepine (TEGRETOL XR) 200 MG 12 hr tablet Take two pills twice daily (total 800 mg per day). 124 tablet 11     traZODone (DESYREL) 50 MG tablet Take 1 tablet (50 mg) by mouth at bedtime 31 tablet 11     amoxicillin (AMOXIL) 875 MG tablet Take 1 tablet (875 mg) by mouth 2 times daily (Patient not taking: Reported on 11/13/2024) 20 tablet 0        Medication Notes:  AEDs as above.      AED Medication Compliance:  compliant all of the time  Using a pill box:  Medications are administered to patient.      Review of Systems:  No vomiting, diarrhea, fevers, hematuria or kidney stones.  Have you experienced a traumatic fall since your last visit: NO  Are these falls related to your seizures: Not Applicable    Other Issues:    No other health troubles. Still talks to self at times, especially when gets upset. Will get into room and not distract others.   Less tearfulness. Little insomnia or night-time wakefulness following trazodone and family would like to continue this medication.  Lives at home with family  Is patient safe to drive:  No    Exam:    /76   Pulse 107   Temp 97.3  F (36.3  C) (Temporal)   Wt 149 lb 3.2 oz (67.7 kg)   SpO2 98%   BMI 25.29 kg/m       Wt Readings from Last 5 Encounters:   11/13/24 149 lb 3.2 oz (67.7 kg)   11/08/23 138 lb 3.2 oz (62.7 kg)   04/07/23 136 lb 3.2 oz (61.8 kg)   08/11/22 143 lb 3.2 oz (65 kg)   11/04/21 150 lb 3.2 oz (68.1 kg)     Alert. Speaks in [place], a few English words. Oriented to day, month, but cannot state year. Oriented to location \"doctor clinic\". Can count from one to ten forward and backward. However, has to use fingers to do so. He follows simple and two step commands. He is able to repeat three items. When asked about them after distractor he repeats two items and interposes a third item that is somewhat related to one of the two memory items that he recalls. Minor " echopraxia.    Normal gait. EOMI. Disrupted smooth pursuit. No nystagmus. Smile symmetrical. Tongue midline.  No drift, pronation, or tremor. FFN is done well. DTR active at knees, cannot obtain at ankles. Strength is full in legs. Tone is normal. No grasp reflexes.    11/8/2023 carbamazepine = 8.3, sodium = 142    IMPRESSION  1) Focal epilepsy; multifocal epileptiform discharges on EEG; left posterior hemisphere best developed. Per family seizures had been under complete control since 2018 until May 2021 when presented with three seizures, apparently in setting of noncompliance. Further seizures in early July 2022, Feb 2023, again when ran out of AEDs. Now again with sustained seizure control when taking AEDs regularly for more than one year on carbamazepine monotherapy.  2) Developmental delay, irritability. Normal MRI. Irritability; unclear if depression, ASD, perhaps some tendency toward paranoia. Difficult to assess through language barrier. Does not appear overtly psychotic. More extensive MSE today, still limited by language continues to indicate moderate level of developmental delay and I do not believe he can meaningfully study for to be examined for citizenship, or engage in competitive employment. Mood appears better, perhaps because of trazodone.  3) Vitamin D deficiency; family has not pursued treatment as previously discussed.  4) Appears intolerant of levetiracetam.  5) Sleep maintenance insomnia, apparently responded to trazodone.     PLAN:  1) Continue carbamazepine 400 mg twice a day. Continue trazodone. Refilled both Rx.  2) Cbz level to confirm compliance. Sodium to exclude carbamazepine associted hyponatremia. Vit D level for follow.  3) Discussed importance of compliance. Provided with clinic number.  4) Call if any seizures. RTC in one year.     Total time in person today 22 min. Additional 5 min reviewing chart prior to visit. Additional 8 minutes generating note. Total 35 min on day of  visit. Discussed with family who provided unique information. Significant psychosocial obstacles including language, understanding of medical system, refill authorizations, etc. The longitudinal plan of care for this patient's epilepsy (including epilepsy comorbidities if appropriate) was addressed during this visit. Due to the added complexity in care, I will continue to support this patient in the subsequent management of this condition(s) and with the ongoing continuity of care of this condition(s).    Clay Dyson MD      Again, thank you for allowing me to participate in the care of your patient.      Sincerely,    Clay Dyson MD

## 2024-11-13 NOTE — PROGRESS NOTES
[facility] Epilepsy Care Progress Note      Patient:  René Kumar  :  1995   Age:  28 year old   Today's Office Visit:  2024    Epilepsy Data:    Patient History  Primary Epileptologist/Provider: Clay Dyson M.D.  Patient Status: Controlled  Epilepsy Syndrome: Localization-related epilepsy unspecified  Epilepsy Syndrome Status: Undetermined - Evaluation in Progress  Age of Onset: Age 10  Etiology  : Unknown  Other Relevant Dx/ Issues: suspected ASD     Tests/Surgery History  Last [id number]    Seizure Record  Current Visit Date: 24  Previous Visit Date: 23  Months since last visit: 12.  Seizure Type 1: Tonic-clonic seizures  Description of Sz Type 1: family describes fall, stiffening and shaking. he is unresponsive with tongue bite and urinary incontinence.  # of Type 1 Seizure since last visit: 0  Freq. Type 1 / Month: 0    Background History:  [place] male seizure onset 9 yo. EEG with multifocal (Lt T, Rt T, Lt PS, Rt PS) epileptiform discharges. Severe epilepsy reportedly with daily convulsive seizures while in [facility] but reportedly with complete control [place]. Mild to mod DE and poss ASD. CDI MRI normal with nl hippocampal volume. Breakthrough seizure May 2021, 2022 when ran out of meds. Levetiracetam was initiated but stopped because of insomnia, we substituted low dose VPA.    History of Present Illness:     No seizures since last seen a year ago. Last seizure February of last year.  Last visit he had just discontinue the DVP and he has remained seizure free.    Family feels that his cognitive status is the same.    Current Outpatient Medications   Medication Sig Dispense Refill    carBAMazepine (TEGRETOL XR) 200 MG 12 hr tablet Take two pills twice daily (total 800 mg per day). 124 tablet 11    traZODone (DESYREL) 50 MG tablet Take 1 tablet (50 mg) by mouth at bedtime 31 tablet 11    amoxicillin  "(AMOXIL) 875 MG tablet Take 1 tablet (875 mg) by mouth 2 times daily (Patient not taking: Reported on 11/13/2024) 20 tablet 0        Medication Notes:  AEDs as above.      AED Medication Compliance:  compliant all of the time  Using a pill box:  Medications are administered to patient.      Review of Systems:  No vomiting, diarrhea, fevers, hematuria or kidney stones.  Have you experienced a traumatic fall since your last visit: NO  Are these falls related to your seizures: Not Applicable    Other Issues:    No other health troubles. Still talks to self at times, especially when gets upset. Will get into room and not distract others.   Less tearfulness. Little insomnia or night-time wakefulness following trazodone and family would like to continue this medication.  Lives at home with family  Is patient safe to drive:  No    Exam:    /76   Pulse 107   Temp 97.3  F (36.3  C) (Temporal)   Wt 149 lb 3.2 oz (67.7 kg)   SpO2 98%   BMI 25.29 kg/m       Wt Readings from Last 5 Encounters:   11/13/24 149 lb 3.2 oz (67.7 kg)   11/08/23 138 lb 3.2 oz (62.7 kg)   04/07/23 136 lb 3.2 oz (61.8 kg)   08/11/22 143 lb 3.2 oz (65 kg)   11/04/21 150 lb 3.2 oz (68.1 kg)     Alert. Speaks in [place], a few English words. Oriented to day, month, but cannot state year. Oriented to location \"doctor clinic\". Can count from one to ten forward and backward. However, has to use fingers to do so. He follows simple and two step commands. He is able to repeat three items. When asked about them after distractor he repeats two items and interposes a third item that is somewhat related to one of the two memory items that he recalls. Minor echopraxia.    Normal gait. EOMI. Disrupted smooth pursuit. No nystagmus. Smile symmetrical. Tongue midline.  No drift, pronation, or tremor. FFN is done well. DTR active at knees, cannot obtain at ankles. Strength is full in legs. Tone is normal. No grasp reflexes.    11/8/2023 carbamazepine = 8.3, " sodium = 142    IMPRESSION  1) Focal epilepsy; multifocal epileptiform discharges on EEG; left posterior hemisphere best developed. Per family seizures had been under complete control since 2018 until May 2021 when presented with three seizures, apparently in setting of noncompliance. Further seizures in early July 2022, Feb 2023, again when ran out of AEDs. Now again with sustained seizure control when taking AEDs regularly for more than one year on carbamazepine monotherapy.  2) Developmental delay, irritability. Normal MRI. Irritability; unclear if depression, ASD, perhaps some tendency toward paranoia. Difficult to assess through language barrier. Does not appear overtly psychotic. More extensive MSE today, still limited by language continues to indicate moderate level of developmental delay and I do not believe he can meaningfully study for to be examined for citizenship, or engage in competitive employment. Mood appears better, perhaps because of trazodone.  3) Vitamin D deficiency; family has not pursued treatment as previously discussed.  4) Appears intolerant of levetiracetam.  5) Sleep maintenance insomnia, apparently responded to trazodone.     PLAN:  1) Continue carbamazepine 400 mg twice a day. Continue trazodone. Refilled both Rx.  2) Cbz level to confirm compliance. Sodium to exclude carbamazepine associted hyponatremia. Vit D level for follow.  3) Discussed importance of compliance. Provided with clinic number.  4) Call if any seizures. RTC in one year.     Total time in person today 22 min. Additional 5 min reviewing chart prior to visit. Additional 8 minutes generating note. Total 35 min on day of visit. Discussed with family who provided unique information. Significant psychosocial obstacles including language, understanding of medical system, refill authorizations, etc. The longitudinal plan of care for this patient's epilepsy (including epilepsy comorbidities if appropriate) was addressed during  this visit. Due to the added complexity in care, I will continue to support this patient in the subsequent management of this condition(s) and with the ongoing continuity of care of this condition(s).    Clay Dyson MD